# Patient Record
Sex: FEMALE | Race: BLACK OR AFRICAN AMERICAN | NOT HISPANIC OR LATINO | Employment: UNEMPLOYED | URBAN - METROPOLITAN AREA
[De-identification: names, ages, dates, MRNs, and addresses within clinical notes are randomized per-mention and may not be internally consistent; named-entity substitution may affect disease eponyms.]

---

## 2017-03-01 ENCOUNTER — HISTORICAL (OUTPATIENT)
Dept: ADMINISTRATIVE | Facility: HOSPITAL | Age: 57
End: 2017-03-01

## 2017-06-14 DIAGNOSIS — M79.641 BILATERAL HAND PAIN: Primary | ICD-10-CM

## 2017-06-14 DIAGNOSIS — M79.642 BILATERAL HAND PAIN: Primary | ICD-10-CM

## 2017-06-15 ENCOUNTER — HOSPITAL ENCOUNTER (OUTPATIENT)
Dept: RADIOLOGY | Facility: HOSPITAL | Age: 57
Discharge: HOME | End: 2017-06-15
Attending: ORTHOPAEDIC SURGERY
Payer: MEDICARE

## 2017-06-15 ENCOUNTER — OFFICE VISIT (OUTPATIENT)
Dept: ORTHOPEDICS | Facility: CLINIC | Age: 57
End: 2017-06-15
Payer: MEDICARE

## 2017-06-15 VITALS
HEIGHT: 59 IN | WEIGHT: 293 LBS | RESPIRATION RATE: 12 BRPM | HEART RATE: 80 BPM | BODY MASS INDEX: 59.07 KG/M2 | DIASTOLIC BLOOD PRESSURE: 78 MMHG | SYSTOLIC BLOOD PRESSURE: 121 MMHG

## 2017-06-15 DIAGNOSIS — R20.0 NUMBNESS AND TINGLING IN LEFT HAND: Primary | ICD-10-CM

## 2017-06-15 DIAGNOSIS — M79.641 BILATERAL HAND PAIN: ICD-10-CM

## 2017-06-15 DIAGNOSIS — S63.502A LEFT WRIST SPRAIN, INITIAL ENCOUNTER: ICD-10-CM

## 2017-06-15 DIAGNOSIS — M79.642 LEFT HAND PAIN: ICD-10-CM

## 2017-06-15 DIAGNOSIS — R20.2 NUMBNESS AND TINGLING IN LEFT HAND: Primary | ICD-10-CM

## 2017-06-15 DIAGNOSIS — M79.642 BILATERAL HAND PAIN: ICD-10-CM

## 2017-06-15 PROCEDURE — 99203 OFFICE O/P NEW LOW 30 MIN: CPT | Mod: S$GLB,,, | Performed by: PHYSICIAN ASSISTANT

## 2017-06-15 PROCEDURE — 99999 PR PBB SHADOW E&M-EST. PATIENT-LVL IV: CPT | Mod: PBBFAC,,, | Performed by: PHYSICIAN ASSISTANT

## 2017-06-15 PROCEDURE — 73130 X-RAY EXAM OF HAND: CPT | Mod: 26,RT,, | Performed by: RADIOLOGY

## 2017-06-15 PROCEDURE — 73130 X-RAY EXAM OF HAND: CPT | Mod: 26,LT,, | Performed by: RADIOLOGY

## 2017-06-15 RX ORDER — MELOXICAM 15 MG/1
15 TABLET ORAL DAILY
Qty: 30 TABLET | Refills: 1 | Status: SHIPPED | OUTPATIENT
Start: 2017-06-15 | End: 2017-07-20

## 2017-06-15 RX ORDER — DICLOFENAC SODIUM 50 MG/1
50 TABLET, DELAYED RELEASE ORAL 2 TIMES DAILY
COMMUNITY
End: 2017-06-15 | Stop reason: ALTCHOICE

## 2017-06-15 RX ORDER — FUROSEMIDE 20 MG/1
20 TABLET ORAL
COMMUNITY
End: 2017-11-06 | Stop reason: SDUPTHER

## 2017-06-15 RX ORDER — OMEGA-3-ACID ETHYL ESTERS 1 G/1
2 CAPSULE, LIQUID FILLED ORAL
COMMUNITY
End: 2017-11-06

## 2017-06-15 RX ORDER — GABAPENTIN 300 MG/1
CAPSULE ORAL
Refills: 2 | COMMUNITY
Start: 2017-03-08 | End: 2017-07-20

## 2017-06-15 RX ORDER — MULTIVITAMIN
1 TABLET ORAL
COMMUNITY
End: 2017-11-06
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
## 2017-07-03 ENCOUNTER — TELEPHONE (OUTPATIENT)
Dept: OTOLARYNGOLOGY | Facility: CLINIC | Age: 57
End: 2017-07-03

## 2017-07-03 NOTE — TELEPHONE ENCOUNTER
Returned patients phone call and rescheduled patients appointment for a day and time that worked well for the patient. Patient was very happy with the call and the call ended well.

## 2017-07-03 NOTE — TELEPHONE ENCOUNTER
----- Message from Molly uGerra sent at 6/30/2017  4:06 PM CDT -----  Contact: [phone]   Patient was scheduled on 6/30/17 for an appointment but state transportation did not bring her to the appointment. Patient has an appointment next week on 7/6/17 and would like to know if she can be worked into the schedule on the same day.patient is having a hard time swallowing and throat and tongue are both sore. Please call back at [phone].      Thanks,  Molly Guerra

## 2017-07-06 ENCOUNTER — OFFICE VISIT (OUTPATIENT)
Dept: PHYSICAL MEDICINE AND REHAB | Facility: CLINIC | Age: 57
End: 2017-07-06
Payer: MEDICARE

## 2017-07-06 ENCOUNTER — TELEPHONE (OUTPATIENT)
Dept: PULMONOLOGY | Facility: CLINIC | Age: 57
End: 2017-07-06

## 2017-07-06 ENCOUNTER — PROCEDURE VISIT (OUTPATIENT)
Dept: PULMONOLOGY | Facility: CLINIC | Age: 57
End: 2017-07-06
Payer: MEDICARE

## 2017-07-06 ENCOUNTER — HOSPITAL ENCOUNTER (OUTPATIENT)
Dept: RADIOLOGY | Facility: HOSPITAL | Age: 57
Discharge: HOME | End: 2017-07-06
Attending: NURSE PRACTITIONER
Payer: MEDICARE

## 2017-07-06 ENCOUNTER — OFFICE VISIT (OUTPATIENT)
Dept: PULMONOLOGY | Facility: CLINIC | Age: 57
End: 2017-07-06
Payer: MEDICARE

## 2017-07-06 VITALS
BODY MASS INDEX: 59.07 KG/M2 | HEART RATE: 73 BPM | SYSTOLIC BLOOD PRESSURE: 124 MMHG | DIASTOLIC BLOOD PRESSURE: 78 MMHG | RESPIRATION RATE: 14 BRPM | HEIGHT: 59 IN | WEIGHT: 293 LBS

## 2017-07-06 VITALS
RESPIRATION RATE: 22 BRPM | BODY MASS INDEX: 59.07 KG/M2 | HEIGHT: 59 IN | OXYGEN SATURATION: 99 % | HEART RATE: 80 BPM | SYSTOLIC BLOOD PRESSURE: 120 MMHG | DIASTOLIC BLOOD PRESSURE: 70 MMHG | WEIGHT: 293 LBS

## 2017-07-06 DIAGNOSIS — R06.02 SOB (SHORTNESS OF BREATH): ICD-10-CM

## 2017-07-06 DIAGNOSIS — R06.02 SOB (SHORTNESS OF BREATH): Primary | ICD-10-CM

## 2017-07-06 DIAGNOSIS — E66.01 MORBID OBESITY WITH BMI OF 60.0-69.9, ADULT: ICD-10-CM

## 2017-07-06 DIAGNOSIS — I51.7 MILD CARDIOMEGALY: ICD-10-CM

## 2017-07-06 DIAGNOSIS — J20.9 ACUTE BRONCHITIS, UNSPECIFIED ORGANISM: ICD-10-CM

## 2017-07-06 DIAGNOSIS — G56.03 BILATERAL CARPAL TUNNEL SYNDROME: Primary | ICD-10-CM

## 2017-07-06 DIAGNOSIS — H66.92 ACUTE LEFT OTITIS MEDIA: ICD-10-CM

## 2017-07-06 DIAGNOSIS — S63.502S LEFT WRIST SPRAIN, SEQUELA: ICD-10-CM

## 2017-07-06 DIAGNOSIS — G47.30 SLEEP APNEA, UNSPECIFIED TYPE: ICD-10-CM

## 2017-07-06 DIAGNOSIS — R60.0 BILATERAL LEG EDEMA: ICD-10-CM

## 2017-07-06 PROBLEM — G89.29 CHRONIC MIDLINE LOW BACK PAIN WITH BILATERAL SCIATICA: Status: ACTIVE | Noted: 2017-03-07

## 2017-07-06 PROBLEM — E78.00 HYPERCHOLESTEROLEMIA: Status: ACTIVE | Noted: 2017-03-08

## 2017-07-06 PROBLEM — M54.42 CHRONIC MIDLINE LOW BACK PAIN WITH BILATERAL SCIATICA: Status: ACTIVE | Noted: 2017-03-07

## 2017-07-06 PROBLEM — R73.01 ELEVATED FASTING GLUCOSE: Status: ACTIVE | Noted: 2017-03-08

## 2017-07-06 PROBLEM — I10 ESSENTIAL HYPERTENSION: Status: ACTIVE | Noted: 2017-03-07

## 2017-07-06 PROBLEM — M54.41 CHRONIC MIDLINE LOW BACK PAIN WITH BILATERAL SCIATICA: Status: ACTIVE | Noted: 2017-03-07

## 2017-07-06 PROBLEM — R73.09 ELEVATED GLYCOSYLATED HEMOGLOBIN: Status: ACTIVE | Noted: 2017-03-08

## 2017-07-06 LAB
PRE FEV1 FVC: 78.09 % (ref 69.36–90.71)
PRE FEV1: 1.75 L (ref 1.22–2.25)
PRE FVC: 2.24 L (ref 1.59–2.8)
PRE PEF: 6.07 L/S (ref 3.31–6.72)

## 2017-07-06 PROCEDURE — 94060 EVALUATION OF WHEEZING: CPT | Mod: S$GLB,,, | Performed by: INTERNAL MEDICINE

## 2017-07-06 PROCEDURE — 99999 PR PBB SHADOW E&M-EST. PATIENT-LVL IV: CPT | Mod: PBBFAC,,, | Performed by: NURSE PRACTITIONER

## 2017-07-06 PROCEDURE — 71020 XR CHEST PA AND LATERAL: CPT | Mod: TC,PO

## 2017-07-06 PROCEDURE — 99205 OFFICE O/P NEW HI 60 MIN: CPT | Mod: S$GLB,,, | Performed by: NURSE PRACTITIONER

## 2017-07-06 PROCEDURE — 99203 OFFICE O/P NEW LOW 30 MIN: CPT | Mod: 25,S$GLB,, | Performed by: PHYSICAL MEDICINE & REHABILITATION

## 2017-07-06 PROCEDURE — 71020 XR CHEST PA AND LATERAL: CPT | Mod: 26,,, | Performed by: RADIOLOGY

## 2017-07-06 PROCEDURE — 95911 NRV CNDJ TEST 9-10 STUDIES: CPT | Mod: S$GLB,,, | Performed by: PHYSICAL MEDICINE & REHABILITATION

## 2017-07-06 PROCEDURE — 99999 PR PBB SHADOW E&M-EST. PATIENT-LVL III: CPT | Mod: PBBFAC,,, | Performed by: PHYSICAL MEDICINE & REHABILITATION

## 2017-07-06 RX ORDER — PREDNISONE 20 MG/1
TABLET ORAL
Qty: 20 TABLET | Refills: 0 | Status: SHIPPED | OUTPATIENT
Start: 2017-07-06 | End: 2017-07-20 | Stop reason: SDDI

## 2017-07-06 RX ORDER — AMOXICILLIN AND CLAVULANATE POTASSIUM 875; 125 MG/1; MG/1
1 TABLET, FILM COATED ORAL 2 TIMES DAILY
Qty: 20 TABLET | Refills: 0 | Status: SHIPPED | OUTPATIENT
Start: 2017-07-06 | End: 2017-07-16

## 2017-07-06 NOTE — PATIENT INSTRUCTIONS
Monitoring Your Sleep: Sleep Lab Testing  Checking your sleep during a nighttime sleep study is often the only way to find out if you have conditions such as sleep apnea or other sleep problems. A sleep study records how your lungs, heart, brain, and other parts of your body function while youre asleep. Its painless, risk-free, and in most cases takes a single, full night.     During a sleep study, sensors track and record body functions.   Testing in a sleep clinic  If you spend the night in a sleep clinic, you will have a private bedroom. A technician will attach many sensors to your body, then go into another room. As you sleep, your heart rate, breathing, oxygen level, brain activity, and other functions will be tracked. A microphone and video camera will record your breathing sounds and body movements. The technician will keep watch nearby. If you need an air pressure device to help you breathe, one will be available.  Tips for testing in a sleep lab  · Before your sleep study, bathe and wash your hair. Dont use conditioners, oils, or makeup.  · Stick to your normal routine. If you usually drink alcohol, exercise, or take medication before bed, ask your healthcare provider whether you should do so the night of your study. Most people undergoing a sleep study should take all of their medicines as they typically would at home. But, it is best to check with your healthcare provider to confirm.  · Bring your toothbrush, sleepwear, pillow, something to read, and anything else that will help you sleep well.  Getting the results  The results of your sleep study need to be scored and interpreted. Once this is done, your healthcare provider will discuss the findings with you. The sleep study results will show whether you have sleep apnea. It can also tell how severe the apnea is. The findings help your healthcare provider know which treatment or treatments may be the right ones for you.  Date Last Reviewed:  8/9/2015 © 2000-2016 [employer]. [address] [place] [address], [place] [address]. All rights reserved. This information is not intended as a substitute for professional medical care. Always follow your healthcare professional's instructions.          What is a Sleep Study?  Do you often have problems sleeping? Do you feel tired most days of the week? Talk to your healthcare provider or a sleep specialist. He or she may suggest that you have a sleep study. It can help diagnose a sleep disorder such as sleep apnea or narcolepsy. During the study, a special machine is used to monitor your sleep.    Who needs a sleep study?  If you have sleep problems that last longer than a few weeks, you may need a sleep study. Talk to your healthcare provider. Be prepared to answer questions about your health history. Try to keep a daily sleep diary for a week or 2. Write down the time you go to bed, the time you wake up, and anything that seems to affect your sleep. Then your healthcare provider can refer you to a sleep specialist and recommend a sleep study.  Monitoring your sleep  Your sleep can be monitored at a sleep clinic or at your home. In either case, your healthcare provider will discuss the results with you at a future visit:  · At a sleep clinic. Most sleep studies are done at a sleep clinic or a sleep lab. In many cases, you will need to stay overnight. You will sleep in a private room, much like a hotel or hospital room. A family member or a friend can come along, but cannot stay overnight. Most people dont have trouble sleeping during the study. In the morning you can go home. Sometimes you may be asked to remain at the lab the next day for a daytime nap study.  · At home. At times, a sleep study can be done at home. A home sleep study provides most of the same information as a study done at a clinic. A special computer is loaned to you by a sleep clinic or a medical supplier. You will be given instructions  on how to use it. Or, someone may come to your home to help. Before bedtime, the computer is turned on to monitor your sleep all night. In the morning, you return the computer.  Date Last Reviewed: 8/9/2015  © 9442-7214 [employer]. [address] [place] [address]. All rights reserved. This information is not intended as a substitute for professional medical care. Always follow your healthcare professional's instructions.

## 2017-07-06 NOTE — PROGRESS NOTES
"Subjective:     Chief Complaint   Patient presents with    Shortness of Breath        Bernadette Gibson is a 57 y.o. female who presents for initial pulmonary evaluation of shortness of breath over the past about 3 months with review of spirometry and chest xray.  Spirometry is normal.   Chest xray revealed borderline cardiomegaly and mild prominence of central pulmonary vasculature.   Dyspnea has been moderate, and generally lasting brief, comes and goes.   Episodes usually occur intermittently, during sleep and off and on during the day and have been unchanged.   Associated symptoms include: cough with white and yellow sputum and leg swelling. Cough is at night with a sensation of gasping for air.   The symptoms are aggravated by recumbency, and relieved by sitting up.    Never diagnosed with asthma or copd, has hx of wheezing in past with a bout of bronchitis.   Patient reports she was exposed to a gas odor outside her home about 3 months ago which she reports [employer] has corrected.   She feels her cough and shortness of breath are related to her smelling "gas".   She reports watery eyes off and on over past 3 months, has seen opthalmology.  Complaint of left ear pain and nasal congestion acute. She has scheduled herself ENT appointment for 7/7/2017.   Patient has been told she snores and awakening gasping for air at night with sensation of shortness of breath.  Patient reports awakening feeling refreshed, on day she feels sleepy, she lays down for more sleep.  She denies morning headache.   Shedenies impairment of activity during wakefulness.  She reports day time napping.  Day time napping duration 2 hours sometimes longer.   [place] sleepiness score was 15.  Neck circumference is 42 cm.   She reports weight gain over past year.   Mallampati score 3  Cardiovascular risk factors: hypertension, hyperlipidemia, coronary artery disease and obesity  Bed time is 0800pm, awake at 12-1am watch [employer], watches 1-2 movies, " snacks, and does chores, then back to bed 4-5 am and sleeps until about 12 noon.  If has appointments, then to bed 7pm - 1 am and will not watch TV so can go back to sleep and up for 7am.  Wake time is sporadic.   Sleep onset is within 15 Minutes.  Sleep maintenance difficulties related to awakening gasping and sob.   Wake after sleep onset occurs two times a night.  Nocturia occurs two times a night,   Sleep aids : occasional tylenol pm.   Dry mouth : No,   Sleep walking: No,   Sleep talking : No,   Sleep eating:No  Vivid Dreams : No,   Cataplexy : No,   Hypnogogic hallucinations:  No    STOP - BANG Questionnaire:     1. Snoring : Do you snore loudly ?    Yes  2. Tired : Do you often feel tired, fatigued, or sleepy during daytime?   Yes  3. Observed: Has anyone observed you stop breathing during your sleep?   Yes   4. Blood pressure : Do you have or are you being treated for high blood pressure?   Yes  5. BMI :BMI more than 35 kg/m2?   Yes  6. Age : Age over 50 yr old?   Yes  7. Neck circumference: Neck circumference greater than 40 cm?   Yes  8. Gender: Gender male?   No    Score: 7    High risk of PEREZ: Yes 5 - 8  Intermediate risk of PEREZ: Yes 3 - 4  Low risk of PEREZ: Yes 0 - 2    The following portions of the patient's history were reviewed and updated as appropriate: allergies, current medications, past family history, past medical history, past social history, past surgical history and problem list.    Review of Systems  A comprehensive review of systems was negative except for: Eyes: positive for watering off and on  Ears, nose, mouth, throat, and face: positive for earaches and nasal congestion  Respiratory: positive for cough, dyspnea on exertion and sputum  Gastrointestinal: positive for dyspepsia, nausea and reflux symptoms  Integument/breast: positive for breast tenderness and left breast tenderness to palpation  Musculoskeletal: positive for left wrist and hand pain  Behavioral/Psych: positive for obesity  "     Objective:      Physical Exam  /70 (BP Location: Right arm, Patient Position: Sitting, BP Method: Manual)   Pulse 80   Resp (!) 22   Ht 4' 11.02" (1.499 m)   Wt (!) 149.7 kg (330 lb 0.5 oz)   LMP  (LMP Unknown)   SpO2 99%   BMI 66.62 kg/m²   General appearance: alert, appears older than stated age, cooperative, morbidly obese and some whimpering about being exposed to gas and having multiple medical concerns  Head: Normocephalic, without obvious abnormality, atraumatic  Eyes: negative  Ears: abnormal TM left ear - erythematous and retracted, mild swelling and tenderness of left external canal with evidence of comedone.   Nose: Nares normal. Septum midline. Mucosa normal. No drainage or sinus tenderness.  Throat: lips, mucosa, and tongue normal; teeth and gums normal  Neck: no adenopathy, no carotid bruit, no JVD, supple, symmetrical, trachea midline and thyroid not enlarged, symmetric, no tenderness/mass/nodules  Lungs: clear to auscultation bilaterally  Breasts: Inspection negative, No nipple retraction or dimpling, No nipple discharge or bleeding, No axillary or supraclavicular adenopathy, Normal to palpation without dominant masses, positive findings: generalized tenderness of left breast  Heart: regular rate and rhythm, S1, S2 normal, no murmur, click, rub or gallop  Abdomen: soft, non-tender; bowel sounds normal; no masses,  no organomegaly  Extremities: edema 1+ bilateral lower legs and no ulcers, gangrene or trophic changes  Pulses: 2+ and symmetric  Skin: Skin color, texture, turgor normal. No rashes or lesions  Lymph nodes: Cervical, supraclavicular, and axillary nodes normal.  Neurologic: Grossly normal    Pulmonary function tests: 7/6/2017 FEV1: 1.75  (101 % predicted), FVC:  2.24 (102 % predicted), FEV1/FVC:  78 (98 % predicted).   Post bronchodilator: FEV1: 1.88  (109 % predicted), FVC:  2.32 (105 % predicted), FEV1/FVC:  81 (102 % predicted).   Normal spirometry. No bronchodilator " response.     Imaging  Chest x-ray: 7/6/2017  Chest two views.  Comparison 01/06/2011.  Findings: There is borderline cardiomegaly and mild prominence of the central pulmonary vasculature.    Minor chronic scarring or subsegmental atelectasis present in the lingula.    No confluent infiltrate, overt edema, or pleural effusion demonstrated.    Multilevel thoracic spondylosis.    Lab Review   No visits with results within 2 Month(s) from this visit.   Latest known visit with results is:   Historical on 06/10/2008   Component Date Value    BUN, Bld 06/10/2008 12     Sodium 06/10/2008 140     Potassium 06/10/2008 4.3     Chloride 06/10/2008 103     CO2 06/10/2008 25     Glucose 06/10/2008 87     Creatinine 06/10/2008 0.9     Calcium 06/10/2008 9.8            Assessment/Plan:           Bernadette PHILIPPE was seen today for shortness of breath.    Diagnoses and all orders for this visit:    SOB (shortness of breath)  Comments:  hypoventilation syndrome suspected with BMI 66. proceed with pul stress, abg. cardiac evaluation  Orders:  -     Ambulatory Referral to Cardiology  -     Stress test, pulmonary; Future  -     PULM - Arterial Blood Gases--in addition to PFT only; Future  -     Polysomnogram (CPAP will be added if patient meets diagnostic criteria.); Future    Acute bronchitis, unspecified organism  Comments:  productive cough yellow mucous over past 3 weeks, not improved, complete augmentin and prednisone taper.   Orders:  -     predniSONE (DELTASONE) 20 MG tablet; 3 po x 3 days, 2 po x 3 days, 1 po x 3 days, 1/2 tab x 4 days.  -     amoxicillin-clavulanate 875-125mg (AUGMENTIN) 875-125 mg per tablet; Take 1 tablet by mouth 2 (two) times daily.    Acute left otitis media  Comments:  acute left ear pain with erythema, begin Augmentin. keep ent appt for 7/7/2017    Orders:  -     amoxicillin-clavulanate 875-125mg (AUGMENTIN) 875-125 mg per tablet; Take 1 tablet by mouth 2 (two) times daily.    Sleep apnea, unspecified  type  Comments:  gasping, snoring, sob during night, morbid obesity w/bmi 66.6, Stop bang score 7. high risk for obstructive sleep apnea, proceed with sleep study.   Orders:  -     Polysomnogram (CPAP will be added if patient meets diagnostic criteria.); Future    Bilateral leg edema  Comments:  chronic, 1+ edema lower legs, on Lasix 20 mg daily. keep fu with pcp to establish care. fu with cardiology    Orders:  -     Ambulatory Referral to Cardiology    Mild cardiomegaly  Comments:  identified on cxr 7/6/2017. fu with cardiology for further evaluate.   Orders:  -     Ambulatory Referral to Cardiology  -     Polysomnogram (CPAP will be added if patient meets diagnostic criteria.); Future    Morbid obesity with BMI of 60.0-69.9, adult  Comments:  calorie reduction  Orders:  -     Ambulatory Referral to Cardiology  -     Stress test, pulmonary; Future  -     PULM - Arterial Blood Gases--in addition to PFT only; Future  -     Polysomnogram (CPAP will be added if patient meets diagnostic criteria.); Future         Patient is to keep upcoming appointments, ENT appointment scheduled for 7/7/2017 with Dr. Camara. PCP appointment to establish care scheduled for 7/11/2017 with Dr. Vashti Lozano.   Patient stated she had reaction to amoxil as a child with mild rash, she has taken as an adult without difficulty.   She reported doxy causes stomach upset in past.    Risk and benefits of steroid therapy were reviewed in detail and include, but not limited to, elevated blood sugars, joint avascular necrosis, necrosis of tissue or lipodystrophy or infection of the injection site, hallucinations, depression or worsening depression, insomnia, sweating, hyperactivity, tremors, suppression of one's own cortisone production, delayed wound healing and GI bleeding. The patient would like to proceed with steroid treatment knowing and verbally accepting the risks.      TIME SPENT WITH PATIENT: Time spent:60 minutes in face to face   discussion concerning diagnosis, prognosis, review of lab and test results, benefits of treatment as well as management of disease, counseling of patient and coordination of care between various health  care providers . Greater than half the time spent was used for coordination of care and counseling of patient as she had multiple complaints.     Return in about 2 weeks (around 7/19/2017) for Shortness of Breath , w/review pul stress/abg.

## 2017-07-06 NOTE — PROGRESS NOTES
[facility] [address] [place] [address]  Phone: [phone]          Full Name: jorge mays Patient ID: [id number]      Visit Date: 7/6/2017 08:15  Examining Physician: Belen Stafford M.D.  Referring Physician: laxmi  Reason for Referral: Uex pain    Chief Complaint   Patient presents with    Hand Pain     left wrist     HPI: This is a 57 year old LHD female who complains of left wrist pain since March.  She was diagnosed with a sprained her wrist while bracing herself in a vehicle that stopped suddently.  Since then she complains of a constant sharp, achy pain in her wrist with numbness/tingling into her fingers.  Wearing a wrist brace, rubbing her hand with bengay, and soaking in epsom salt provides some relief.  She as some issues with  strength as well.  She uses her hands a lot to write and reports it increases her discomfort.  The patient denies any problems until March. Upon review of records, she has a dx of right CTS in 2016    Upon exam:  WDWN female NAD alert and oriented  No focal atrophy noted to upper ext muscles except mild at left apb  Skin intact, no breakdown or dysvascular changes, nails intact  tinels + at left wrist  5/5 strength bilateral upper ext muscles except 4/5 apb bilaterally and 4/5 left FDI  2+radial pulses bilaterally  1+bic tric br bilaterally  hoffmans negative bilaterally  Neck shoulder elbow wrist ROM wnl, ttp at 1st mcp jt on left  Sensation: intact to light touch bilateral upper exts except dec at left hand fingertips    ROS  General- denies lethargy, weight change, fever, chills  Head/neck- denies swallowing difficulties  ENT- denies hearing changes  Cardiovascular-denies chest pain  Pulmonary- denies shortness of breath  GI- denies constipation or bowel incontinence  - denies bladder incontinence  Skin- denies wounds or rashes  Musculoskeletal- +weakness, + pain  Neurologic- +numbness and tingling  Psychiatric- denies depressive or  psychotic features, denies anxiety  Lymphatic-denies swelling  Endocrine- denies hypoglycemic symptoms/DM history    Entire procedure explained to patient prior to proceeding.  Verbal consent obtained. (Pt required multiple repetitions of information/explanation and was very agitated about testing contralateral arm)        SNC      Nerve / Sites Rec. Site Onset Lat Peak Lat Amp Segments Distance Velocity     ms ms µV  mm m/s   L Median - Digit II (Antidromic)      Wrist Dig II NR NR NR Wrist - Dig  NR   R Median - Digit II (Antidromic)      Wrist Dig II 3.9 4.8 2.7 Wrist - Dig  36   L Ulnar - Digit V (Antidromic)      Wrist Dig V 3.0 3.7 18.7 Wrist - Dig V 140 47   R Ulnar - Digit V (Antidromic)      Wrist Dig V 2.6 3.4 11.2 Wrist - Dig V 140 55   L Radial - Anatomical snuff box (Forearm)      Forearm Wrist 1.8 2.6 22.2 Forearm - Wrist 100 55   R Radial - Anatomical snuff box (Forearm)      Forearm Wrist 1.7 2.2 12.7 Forearm - Wrist 100 60       MNC      Nerve / Sites Muscle Latency Amplitude Duration Rel Amp Segments Distance Lat Diff Velocity     ms mV ms %  mm ms m/s   L Median - APB      Wrist APB 7.1 5.1 7.3 100 Wrist - APB 80        Elbow APB 11.5 4.7 7.7 91.5 Elbow - Wrist 220 4.4 50   R Median - APB      Wrist APB 5.9 7.6 6.9 100 Wrist - APB 80        Elbow APB 9.9 5.8 6.9 76.7 Elbow - Wrist 200 4.0 50   L Ulnar - ADM      Wrist ADM 3.0 9.0 5.8 100 Wrist - ADM 80        B.Elbow ADM 6.1 8.5 6.2 94.5 B.Elbow - Wrist 190 3.1 61      A.Elbow ADM 8.2 8.1 6.1 95.5 A.Elbow - B.Elbow 120 2.0 59         A.Elbow - Wrist  5.2    R Ulnar - ADM      Wrist ADM 2.8 8.9 7.5 100 Wrist - ADM 80        B.Elbow ADM 6.1 8.6 7.3 96.8 B.Elbow - Wrist 200 3.4 59      A.Elbow ADM 8.1 8.0 7.5 93.4 A.Elbow - B.Elbow 100 2.0 51         A.Elbow - Wrist  5.4                                  INTERPRETATION  -Bilateral median motor nerve conduction study showed prolonged latency, normal amplitude, and normal conduction  velocity  -Left median sensory nerve conduction study showed no response to stimulation  -Right median sensory nerve conduction study showed prolonged peak latency and dec amplitude  -Bilateral ulnar motor nerve conduction study showed normal latency, amplitude, and conduction velocity  -Bilateral ulnar sensory nerve conduction study showed normal peak latency and amplitude  -Bilateral radial sensory nerve conduction study showed normal peak latency and amplitude      IMPRESSION  1. ABNORMAL study  2. 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 Follow up with referring provider:Dr Samuel  2. Handouts on CTS, prevention, release, and wrist sprain provided.  3. This study is good for one year. If symptoms worsen or do not improve, please re-consult.    Belen Stafford M.D.  Physical Medicine and Rehab

## 2017-07-06 NOTE — PATIENT INSTRUCTIONS
Carpal Tunnel Syndrome    Carpal tunnel syndrome is a painful condition of the wrist and arm. It is caused by pressure on the median nerve.  The median nerve is one of the nerves that give feeling and movement to the hand. It passes through a tunnel in the wrist called the carpal tunnel. This tunnel is made up of bones and ligaments. Narrowing of this tunnel or swelling of the tissues inside the tunnel puts pressure on the median nerve. This causes numbness, pins and needles, or electric shooting pains in your hand and forearm. Often the pain is worse at night and may wake you when you are asleep.  Carpal tunnel syndrome may occur during pregnancy and with use of birth control pills. It is more common in workers who must often bend their wrists. It is also common in people who work with power tools that cause strong vibrations.  Home care  · Rest the painful wrist. Avoid repeated bending of the wrist back and forth. This puts pressure on the median nerve. Avoid using power tools with strong vibrations.  · If you were given a splint, wear it at night while you sleep. You may also wear it during the day for comfort.  · Move your fingers and wrists often to avoid stiffness.  · Elevate your arms on pillows when you lie down.  · Try using the unaffected hand more.  · Try not to hold your wrists in a bent, downward position.  · Sometimes changes in the work place may ease symptoms. If you type most of the day, it may help to change the position of your keyboard or add a wrist support. Your wrist should be in a neutral position and not bent back when typing.  · You may use over-the-counter pain medicine to treat pain and inflammation, unless another medicine was prescribed. Anti-inflammatory pain medicines, such as ibuprofen or naproxen may be more effective than acetaminophen, which treats pain, but not inflammation. If you have chronic liver or kidney disease or ever had a stomach ulcer or GI bleeding, talk with your  doctor before using these medicines.  · Opioid pain medicine will only give temporary relief and does not treat the problem. If pain continues, you may need a shot of a steroid drug into your wrist.  · If the above methods fail, you may need surgery. This will open the carpal tunnel and release the pressure on the trapped nerve.  Follow-up care  Follow up with your healthcare provider, or as advised, if the pain doesnt begin to improve within the next week.  If X-rays were taken, you will be notified of any new findings that may affect your care.  When to seek medical advice  Call your healthcare provider right away if any of these occur:  · Pain not improving with the above treatment  · Fingers or hand become cold, blue, numb, or tingly  · Your whole arm becomes swollen or weak  Date Last Reviewed: 11/23/2015  © 0993-1800 [employer]. [address] [place] [address]. All rights reserved. This information is not intended as a substitute for professional medical care. Always follow your healthcare professional's instructions.        Carpal Tunnel Syndrome Prevention Tips  Some repetitive hand activities put you at higher risk for carpal tunnel syndrome (CTS). But you can reduce your risk. Learn how to change the way you use your hands. Below are tips for at home and on the job. Be sure to also follow the hand and wrist safety policies at your workplace.      Keep your wrist in a neutral (straight) position when exercising.      Keep your wrist in neutral  Keep a neutral (straight) wrist position as often as you can. Dont use your wrist in a bent (flexed) position for long periods of time. This includes extended or twisted positions.  Watch your   Dont just use your thumb and index finger to grasp or lift. This can put stress on your wrist. When you can, use your whole hand and all its fingers to grasp an object.  Minimize repetition  Dont move your arms or hands or hold an object in  the same way for long periods of time. Even simple, light tasks can cause injury this way. Instead, alternate tasks or switch hands.  Rest your hands  Give your hands a break from time to time with a rest. Even a few minutes once an hour can help.  Reduce speed and force  Slow down the speed in which you do a forceful, repetitive motion. This gives your wrist time to recover from the effort. Use power tools to help reduce the force.  Strengthen the muscles  Weak muscles may lead to a poor wrist or arm position. Exercises will make your hand and arm muscles stronger. This can help you keep a better position.  Date Last Reviewed: 9/11/2015 © 2000-2016 [employer]. [address] [place] [address], [place] [address]. All rights reserved. This information is not intended as a substitute for professional medical care. Always follow your healthcare professional's instructions.        Understanding Carpal Tunnel Syndrome    The carpal tunnel is a narrow space inside the wrist. It is ringed by bone and a band of tough tissue called the transverse carpal ligament. A major nerve called the median nerve runs from the forearm into the hand through the carpal tunnel. Tendons also run through the carpal tunnel.  With carpal tunnel syndrome, the tendons or nearby tissues within the carpal tunnel may swell or thicken. Or the transverse carpal ligament may harden and shorten. This narrows the space in the carpal tunnel and puts pressure on the median nerve. This pressure leads to tingling and numbness of the hand and wrist. In time, the condition can make even simple tasks hard to do.  What causes carpal tunnel syndrome?  Doctors arent entirely clear why the condition occurs. Certain things may make a person more likely to have it. These include:  · Being female  · Being pregnant  · Being overweight  · Having diabetes or rheumatoid arthritis  Symptoms of carpal tunnel syndrome  Symptoms often come and go. At first, symptoms  may occur mainly at night. Later, they may be noticed during the day as well. They may get worse with activities such as driving, reading, typing, or holding a phone. Symptoms can include:  · Tingling and numbness in the hand or wrist  · Sharp pain that shoots up the arm or down to the fingers  · Hand stiffness or cramping, especially in the morning  · Trouble making a fist  · Hand weakness and clumsiness  Treatment for carpal tunnel syndrome  Certain treatments help reduce the pressure on the median nerve and relieve symptoms. Choices for treatment may include one or more of the following:  · Wrist splint. This involves wearing a special brace on the wrist and hand. The splint holds the wrist straight, in a neutral position. This helps keep the carpal tunnel as open as possible.  · Cortisone shots. Cortisone is a medicine that helps reduce swelling. It is injected directly into the wrist. It helps shrink tissues inside the carpal tunnel. This relieves symptoms for a time.  · Pain medicines. You may take over-the-counter or prescription medicines to help reduce swelling and relieve symptoms.  · Surgery. If the condition doesnt respond to other treatments and doesnt go away on its own, you may need surgery. During surgery, the surgeon cuts the transverse carpal ligament to relieve pressure on the median nerve.     When to call your healthcare provider  Call your healthcare provider right away if you have any of these:  · Fever of 100.4°F (38°C) or higher, or as directed  · Symptoms that dont get better, or get worse  · New symptoms   Date Last Reviewed: 3/10/2016  © 1930-2163 [employer]. [address] [place] [address], [place] [address]. All rights reserved. This information is not intended as a substitute for professional medical care. Always follow your healthcare professional's instructions.        Carpal Tunnel Release Surgery  Surgery may be done if your carpal tunnel syndrome (CTS) symptoms become  severe. Or, you may have surgery if no other treatment brings relief. There are 2 types of CTS procedures. You will be told about the one you will have. Youll also be instructed how to prepare for it.      The goals of surgery  Two types of surgery--open and endoscopic--are used to treat CTS.  · With open surgery, your surgeon makes one incision in your palm. Standard surgical tools are used.  · With endoscopic surgery, one or two small incisions may be made in your hand. A scope (with a very small camera attached) and tools are inserted under the carpal ligament. The surgeon then operates while watching images on a video screen. No matter which one you have, the goal remains the same: Your surgeon will relieve pressure on the median nerve. To do this, the transverse carpal ligament is cut (released).  After surgery  If youve had carpal tunnel surgery, you will spend a few hours resting before you go home. The nerve sensation and circulation in your hand will be checked at this time. For the safest healing, keep the following in mind.  · Keep your hand raised above heart level. This will help reduce swelling.  · Limit hand and wrist use as instructed. A wrist brace may be required.  · Take any pain medication as directed.  · Do hand exercises as directed by your surgeon or therapist.  When to call the surgeon  Call your surgeon if you notice any of the following:  · White or pale-blue hand or nails (If you pinch your skin or nail and the color doesnt return)  · Pain that is not relieved by prescribed medicine  · Loss of sensation or excess swelling in hand or fingers  · Fever over 100.4°F (38°C)   Date Last Reviewed: 9/11/2015 © 2000-2016 [facility]. [address] [place] [address]. All rights reserved. This information is not intended as a substitute for professional medical care. Always follow your healthcare professional's instructions.        Wrist Sprain  A sprain is an injury to  the ligaments or capsule that holds a joint together. There are no broken bones. Most sprains take about 3 to 6 weeks to heal. If it a severe sprain where the ligament is completely torn, it can take months to recover.    Most wrist sprains are treated with a splint, wrist brace, or elastic wrap for support. Severe sprains may require surgery.  Home care  · Keep your arm elevated to reduce pain and swelling. This is very important during the first 48 hours.  · Apply an ice pack over the injured area for 15 to 20 minutes every 3 to 6 hours. You should do this for the first 24 to 48 hours. You can make an ice pack by filling a plastic bag that seals at the top with ice cubes and then wrapping it with a thin towel. Continue to use ice packs for relief of pain and swelling as needed. As the ice melts, be careful to avoid getting your wrap, splint, or cast wet. After 48 hours, apply heat (warm shower or warm bath) for 15 to 20 minutes several times a day, or alternate ice and heat.   · You may use over-the-counter pain medicine to control pain, unless another pain medicine was prescribed. If you have chronic liver or kidney disease or ever had a stomach ulcer or GI bleeding, talk with your doctor before using these medicines.  · If you were given a splint or brace, wear it for the time advised by your doctor.  Follow-up care  Follow up with your healthcare provider as advised. Any X-rays you had today dont show any broken bones, breaks, or fractures. Sometimes fractures dont show up on the first X-ray. Bruises and sprains can sometimes hurt as much as a fracture. These injuries can take time to heal completely. If your symptoms dont improve or they get worse, talk with your doctor. You may need a repeat X-ray. If X-rays were taken, you will be told of any new findings that may affect your care.  When to seek medical advice  Call your healthcare provider right away if any of these occur:  · Pain or swelling  increases  · Fingers or hand becomes cold, blue, numb, or tingly  Date Last Reviewed: 11/20/2015  © 8194-4786 [employer]. [address] [place] [address], [place] [address]. All rights reserved. This information is not intended as a substitute for professional medical care. Always follow your healthcare professional's instructions.        Understanding a Wrist Sprain    A ligament is a strong band of tissue that connects bone to bone. The wrist has many ligaments. If any of these get stretched or torn, this is called a wrist sprain. A wrist sprain can be painful. It can also limit movement and use of the wrist and hand. Depending on the severity of the sprain, it may take a few weeks or longer for the wrist to heal.  Causes of a wrist sprain  Wrist sprains are most often caused by falling and landing on an outstretched hand. Anyone can get a wrist sprain. But the injury may be more likely in people who are very active or play sports.  Symptoms of a wrist sprain  At the time of injury, you may feel a popping or tearing in the wrist. You may have pain, redness, and swelling. You may also have some bruising. In some cases, symptoms may spread from the wrist to the hand. Until the wrist has healed, it may be hard to move or use the wrist and hand for normal tasks and activities, especially gripping and lifting.  Treating a wrist sprain  Treatment for wrist sprains may include any of the following:   · Prescription or over-the-counter medicines. These help reduce pain and swelling.  · A splint, brace, or cast. This is worn to support the wrist and keep the wrist from moving. You may need it for several weeks or longer, until the wrist heals.  · Physical therapy and exercises. These help improve strength, flexibility, and range of motion in the wrist and hand.  · Surgery. You may need surgery if the sprain is severe. For example, if the ligament is torn or if nearby tissues and bone are also injured. After surgery,  you will often need to wear a splint or cast for a month or longer, until the wrist has healed.  Self-care measures  You can do several things to help relieve pain and swelling. These may include:  · Limiting how much you move and use your wrist and hand  · Applying an ice pack to the injured area  · Keeping your wrist and hand raised (elevated) above heart level  · Wrapping your wrist in an elastic bandage  Possible complications  If the injury doesnt heal properly, it has a higher chance of happening again. The injury can also become long-term (chronic). This can cause ongoing pain, weakness, or instability of the wrist. Over time, arthritis may develop in the wrist. This can worsen pain and cause stiffness and limited movement of the wrist and hand.        When to call your healthcare provider  Call your healthcare provider right away if you have any of these:  · Fever of 100.4°F (38°C) or higher, or as directed  · Symptoms that dont get better with treatment, or get worse  · Hand or fingers that feel numb or very cold, turn blue or gray, or swell  · Symptoms such as redness, warmth, swelling, bleeding, or drainage that occur at the incision sites. This only applies if you had surgery.  · New symptoms   Date Last Reviewed: 3/10/2016  © 0165-7122 [employer]. [address] [place] [address], [place] [address]. All rights reserved. This information is not intended as a substitute for professional medical care. Always follow your healthcare professional's instructions.

## 2017-07-06 NOTE — LETTER
July 6, 2017      Juarez Samuel Sr., MD  [address] [facility] [place] [address]           [facility] - Physiatry  [address] [facility] [address] [address] [place] [address]  Phone: [phone]  Fax: [phone]          Patient: Bernadette Gibson   MR Number: [id number]   YOB: 1960   Date of Visit: 7/6/2017       Dear Dr. Mg Chaney:    Thank you for referring Bernadette Gibson to me for evaluation. Attached you will find relevant portions of my assessment and plan of care.    If you have questions, please do not hesitate to call me. I look forward to following Bernadette Gibson along with you.    Sincerely,    Belen Stafford MD    Enclosure  CC:  No Recipients    If you would like to receive this communication electronically, please contact [email or web address] or [phone] to request more information on [employer] access.    For providers and/or their staff who would like to refer a patient to [facility], please contact us through our one-stop-shop provider referral line, [facility], at [phone].    If you feel you have received this communication in error or would no longer like to receive these types of communications, please e-mail [email or web address]

## 2017-07-07 ENCOUNTER — OFFICE VISIT (OUTPATIENT)
Dept: OTOLARYNGOLOGY | Facility: CLINIC | Age: 57
End: 2017-07-07
Payer: MEDICARE

## 2017-07-07 VITALS
TEMPERATURE: 99 F | BODY MASS INDEX: 66.49 KG/M2 | DIASTOLIC BLOOD PRESSURE: 75 MMHG | SYSTOLIC BLOOD PRESSURE: 133 MMHG | WEIGHT: 293 LBS | HEART RATE: 82 BPM

## 2017-07-07 DIAGNOSIS — H92.03 OTALGIA, BILATERAL: ICD-10-CM

## 2017-07-07 DIAGNOSIS — R09.81 NASAL CONGESTION: ICD-10-CM

## 2017-07-07 DIAGNOSIS — R13.10 DYSPHAGIA, UNSPECIFIED TYPE: Primary | ICD-10-CM

## 2017-07-07 PROCEDURE — 99203 OFFICE O/P NEW LOW 30 MIN: CPT | Mod: 25,S$GLB,, | Performed by: OTOLARYNGOLOGY

## 2017-07-07 PROCEDURE — 31575 DIAGNOSTIC LARYNGOSCOPY: CPT | Mod: S$GLB,,, | Performed by: OTOLARYNGOLOGY

## 2017-07-07 PROCEDURE — 99999 PR PBB SHADOW E&M-EST. PATIENT-LVL III: CPT | Mod: PBBFAC,,, | Performed by: OTOLARYNGOLOGY

## 2017-07-07 RX ORDER — FLUTICASONE PROPIONATE 50 MCG
2 SPRAY, SUSPENSION (ML) NASAL DAILY
Qty: 1 BOTTLE | Refills: 12 | Status: SHIPPED | OUTPATIENT
Start: 2017-07-07 | End: 2017-07-20
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
## 2017-07-20 ENCOUNTER — OFFICE VISIT (OUTPATIENT)
Dept: ORTHOPEDICS | Facility: CLINIC | Age: 57
End: 2017-07-20
Payer: MEDICARE

## 2017-07-20 VITALS
HEART RATE: 76 BPM | SYSTOLIC BLOOD PRESSURE: 113 MMHG | WEIGHT: 293 LBS | DIASTOLIC BLOOD PRESSURE: 69 MMHG | BODY MASS INDEX: 59.07 KG/M2 | HEIGHT: 59 IN | RESPIRATION RATE: 12 BRPM

## 2017-07-20 DIAGNOSIS — M25.532 LEFT WRIST PAIN: Primary | ICD-10-CM

## 2017-07-20 DIAGNOSIS — G56.02 LEFT CARPAL TUNNEL SYNDROME: Primary | ICD-10-CM

## 2017-07-20 PROCEDURE — 99214 OFFICE O/P EST MOD 30 MIN: CPT | Mod: S$GLB,,, | Performed by: ORTHOPAEDIC SURGERY

## 2017-07-20 PROCEDURE — 99999 PR PBB SHADOW E&M-EST. PATIENT-LVL III: CPT | Mod: PBBFAC,,, | Performed by: ORTHOPAEDIC SURGERY

## 2017-07-20 RX ORDER — NABUMETONE 500 MG/1
500 TABLET, FILM COATED ORAL DAILY
Qty: 30 TABLET | Refills: 2 | Status: SHIPPED | OUTPATIENT
Start: 2017-07-20 | End: 2017-11-06

## 2017-07-20 NOTE — LETTER
July 20, 2017      Sondra Barker PA-C  [address] [place] [address]           [facility]  [address]  [place] [address]  Phone: [phone]  Fax: [phone]          Patient: Bernadette Gibson   MR Number: [id number]   YOB: 1960   Date of Visit: 7/20/2017       Dear Sondra Barker:    Thank you for referring Bernadette Gibson to me for evaluation. Attached you will find relevant portions of my assessment and plan of care.    If you have questions, please do not hesitate to call me. I look forward to following Bernadette Gibson along with you.    Sincerely,    Juraez Samuel Sr., MD    Enclosure  CC:  No Recipients    If you would like to receive this communication electronically, please contact [email or web address] or [phone] to request more information on [facility] access.    For providers and/or their staff who would like to refer a patient to [facility], please contact us through our one-stop-shop provider referral line, [facility] , at [phone].    If you feel you have received this communication in error or would no longer like to receive these types of communications, please e-mail [email or web address]
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
## 2017-07-20 NOTE — PATIENT INSTRUCTIONS
Carpal Tunnel Syndrome    Carpal tunnel syndrome is a painful condition of the wrist and arm. It is caused by pressure on the median nerve.  The median nerve is one of the nerves that give feeling and movement to the hand. It passes through a tunnel in the wrist called the carpal tunnel. This tunnel is made up of bones and ligaments. Narrowing of this tunnel or swelling of the tissues inside the tunnel puts pressure on the median nerve. This causes numbness, pins and needles, or electric shooting pains in your hand and forearm. Often the pain is worse at night and may wake you when you are asleep.  Carpal tunnel syndrome may occur during pregnancy and with use of birth control pills. It is more common in workers who must often bend their wrists. It is also common in people who work with power tools that cause strong vibrations.  Home care  · Rest the painful wrist. Avoid repeated bending of the wrist back and forth. This puts pressure on the median nerve. Avoid using power tools with strong vibrations.  · If you were given a splint, wear it at night while you sleep. You may also wear it during the day for comfort.  · Move your fingers and wrists often to avoid stiffness.  · Elevate your arms on pillows when you lie down.  · Try using the unaffected hand more.  · Try not to hold your wrists in a bent, downward position.  · Sometimes changes in the work place may ease symptoms. If you type most of the day, it may help to change the position of your keyboard or add a wrist support. Your wrist should be in a neutral position and not bent back when typing.  · You may use over-the-counter pain medicine to treat pain and inflammation, unless another medicine was prescribed. Anti-inflammatory pain medicines, such as ibuprofen or naproxen may be more effective than acetaminophen, which treats pain, but not inflammation. If you have chronic liver or kidney disease or ever had a stomach ulcer or GI bleeding, talk with your  doctor before using these medicines.  · Opioid pain medicine will only give temporary relief and does not treat the problem. If pain continues, you may need a shot of a steroid drug into your wrist.  · If the above methods fail, you may need surgery. This will open the carpal tunnel and release the pressure on the trapped nerve.  Follow-up care  Follow up with your healthcare provider, or as advised, if the pain doesnt begin to improve within the next week.  If X-rays were taken, you will be notified of any new findings that may affect your care.  When to seek medical advice  Call your healthcare provider right away if any of these occur:  · Pain not improving with the above treatment  · Fingers or hand become cold, blue, numb, or tingly  · Your whole arm becomes swollen or weak  Date Last Reviewed: 11/23/2015  © 1524-5188 [employer]. [address] [place] [address], [place] [address]. All rights reserved. This information is not intended as a substitute for professional medical care. Always follow your healthcare professional's instructions.

## 2017-07-21 ENCOUNTER — TELEPHONE (OUTPATIENT)
Dept: ORTHOPEDICS | Facility: CLINIC | Age: 57
End: 2017-07-21

## 2017-07-27 ENCOUNTER — CLINICAL SUPPORT (OUTPATIENT)
Dept: REHABILITATION | Facility: HOSPITAL | Age: 57
End: 2017-07-27
Attending: ORTHOPAEDIC SURGERY
Payer: MEDICARE

## 2017-07-27 DIAGNOSIS — M25.532 LEFT WRIST PAIN: ICD-10-CM

## 2017-07-27 PROCEDURE — 97165 OT EVAL LOW COMPLEX 30 MIN: CPT | Performed by: OCCUPATIONAL THERAPIST

## 2017-07-27 PROCEDURE — G8987 SELF CARE CURRENT STATUS: HCPCS | Mod: CK | Performed by: OCCUPATIONAL THERAPIST

## 2017-07-27 PROCEDURE — 97530 THERAPEUTIC ACTIVITIES: CPT | Performed by: OCCUPATIONAL THERAPIST

## 2017-07-27 PROCEDURE — G8988 SELF CARE GOAL STATUS: HCPCS | Mod: CI | Performed by: OCCUPATIONAL THERAPIST
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
## 2017-07-31 ENCOUNTER — CLINICAL SUPPORT (OUTPATIENT)
Dept: REHABILITATION | Facility: HOSPITAL | Age: 57
End: 2017-07-31
Attending: ORTHOPAEDIC SURGERY
Payer: MEDICARE

## 2017-07-31 DIAGNOSIS — G56.02 LEFT CARPAL TUNNEL SYNDROME: Primary | ICD-10-CM

## 2017-07-31 PROCEDURE — 97110 THERAPEUTIC EXERCISES: CPT | Performed by: OCCUPATIONAL THERAPIST

## 2017-07-31 PROCEDURE — 97530 THERAPEUTIC ACTIVITIES: CPT | Performed by: OCCUPATIONAL THERAPIST

## 2017-07-31 PROCEDURE — 97140 MANUAL THERAPY 1/> REGIONS: CPT | Performed by: OCCUPATIONAL THERAPIST

## 2017-08-01 ENCOUNTER — TELEPHONE (OUTPATIENT)
Dept: ORTHOPEDICS | Facility: CLINIC | Age: 57
End: 2017-08-01

## 2017-08-01 NOTE — TELEPHONE ENCOUNTER
Returned pt phone call. Pt was unavailable. Left message for pt to call back at earliest convenience.
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
## 2017-08-03 ENCOUNTER — TELEPHONE (OUTPATIENT)
Dept: ORTHOPEDICS | Facility: CLINIC | Age: 57
End: 2017-08-03

## 2017-08-03 NOTE — TELEPHONE ENCOUNTER
Attempted to contact the patient in regards to her returning the nurse phone call. Pt did not answer, left vm. -AS

## 2017-08-04 ENCOUNTER — TELEPHONE (OUTPATIENT)
Dept: ORTHOPEDICS | Facility: CLINIC | Age: 57
End: 2017-08-04

## 2017-08-04 NOTE — TELEPHONE ENCOUNTER
----- Message from Duran Hudson sent at 8/4/2017 11:07 AM CDT -----  Contact: pt  Pt is calling nurse staff regarding a New RX for pain . Pt states that pt has no mobility in the legs due to the weight. Pt call back [phone] to be advised today. thanks

## 2017-08-07 ENCOUNTER — TELEPHONE (OUTPATIENT)
Dept: ORTHOPEDICS | Facility: CLINIC | Age: 57
End: 2017-08-07

## 2017-08-07 NOTE — TELEPHONE ENCOUNTER
Returned pt phone call. Advised pt to see her primary care provider for long term pain medication. Pt verified understanding.

## 2017-08-07 NOTE — TELEPHONE ENCOUNTER
----- Message from Katerina Fernandez sent at 8/7/2017  7:46 AM CDT -----  Contact: Pt   Pt states she can not go to physical therapy today but her medication was not called in and she is unable to move her legs. Pt can be reached at [phone]

## 2017-08-11 ENCOUNTER — CLINICAL SUPPORT (OUTPATIENT)
Dept: REHABILITATION | Facility: HOSPITAL | Age: 57
End: 2017-08-11
Attending: ORTHOPAEDIC SURGERY
Payer: MEDICARE

## 2017-08-11 DIAGNOSIS — M25.532 LEFT WRIST PAIN: Primary | ICD-10-CM

## 2017-08-11 PROCEDURE — 97035 APP MDLTY 1+ULTRASOUND EA 15: CPT | Performed by: OCCUPATIONAL THERAPIST

## 2017-08-11 PROCEDURE — 97530 THERAPEUTIC ACTIVITIES: CPT | Performed by: OCCUPATIONAL THERAPIST

## 2017-08-11 PROCEDURE — 97140 MANUAL THERAPY 1/> REGIONS: CPT | Performed by: OCCUPATIONAL THERAPIST
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
## 2017-08-18 ENCOUNTER — CLINICAL SUPPORT (OUTPATIENT)
Dept: REHABILITATION | Facility: HOSPITAL | Age: 57
End: 2017-08-18
Attending: ORTHOPAEDIC SURGERY
Payer: MEDICARE

## 2017-08-18 DIAGNOSIS — G56.02 LEFT CARPAL TUNNEL SYNDROME: ICD-10-CM

## 2017-08-18 DIAGNOSIS — M25.532 LEFT WRIST PAIN: Primary | ICD-10-CM

## 2017-08-18 PROCEDURE — 97140 MANUAL THERAPY 1/> REGIONS: CPT | Performed by: OCCUPATIONAL THERAPIST

## 2017-08-18 PROCEDURE — 97530 THERAPEUTIC ACTIVITIES: CPT | Performed by: OCCUPATIONAL THERAPIST
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
## 2017-08-22 DIAGNOSIS — M25.562 BILATERAL KNEE PAIN: Primary | ICD-10-CM

## 2017-08-22 DIAGNOSIS — M25.561 BILATERAL KNEE PAIN: Primary | ICD-10-CM

## 2017-08-23 ENCOUNTER — CLINICAL SUPPORT (OUTPATIENT)
Dept: REHABILITATION | Facility: HOSPITAL | Age: 57
End: 2017-08-23
Attending: ORTHOPAEDIC SURGERY
Payer: MEDICARE

## 2017-08-23 DIAGNOSIS — G56.02 LEFT CARPAL TUNNEL SYNDROME: ICD-10-CM

## 2017-08-23 DIAGNOSIS — M25.532 LEFT WRIST PAIN: Primary | ICD-10-CM

## 2017-08-23 PROCEDURE — 97530 THERAPEUTIC ACTIVITIES: CPT | Performed by: OCCUPATIONAL THERAPIST

## 2017-08-23 PROCEDURE — 97035 APP MDLTY 1+ULTRASOUND EA 15: CPT | Performed by: OCCUPATIONAL THERAPIST
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
## 2017-09-01 ENCOUNTER — CLINICAL SUPPORT (OUTPATIENT)
Dept: REHABILITATION | Facility: HOSPITAL | Age: 57
End: 2017-09-01
Attending: ORTHOPAEDIC SURGERY
Payer: MEDICARE

## 2017-09-01 DIAGNOSIS — G56.01 CARPAL TUNNEL SYNDROME OF RIGHT WRIST: ICD-10-CM

## 2017-09-01 DIAGNOSIS — G56.02 LEFT CARPAL TUNNEL SYNDROME: ICD-10-CM

## 2017-09-01 DIAGNOSIS — M25.532 LEFT WRIST PAIN: Primary | ICD-10-CM

## 2017-09-01 PROCEDURE — 97110 THERAPEUTIC EXERCISES: CPT | Performed by: OCCUPATIONAL THERAPIST

## 2017-09-01 PROCEDURE — 97140 MANUAL THERAPY 1/> REGIONS: CPT | Performed by: OCCUPATIONAL THERAPIST

## 2017-09-01 PROCEDURE — 97035 APP MDLTY 1+ULTRASOUND EA 15: CPT | Performed by: OCCUPATIONAL THERAPIST
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
## 2017-09-05 DIAGNOSIS — M17.0 PRIMARY OSTEOARTHRITIS OF BOTH KNEES: Primary | ICD-10-CM

## 2017-09-05 DIAGNOSIS — M25.561 ACUTE PAIN OF BOTH KNEES: Primary | ICD-10-CM

## 2017-09-05 DIAGNOSIS — M25.562 ACUTE PAIN OF BOTH KNEES: Primary | ICD-10-CM

## 2017-09-07 ENCOUNTER — CLINICAL SUPPORT (OUTPATIENT)
Dept: REHABILITATION | Facility: HOSPITAL | Age: 57
End: 2017-09-07
Attending: ORTHOPAEDIC SURGERY
Payer: MEDICARE

## 2017-09-07 DIAGNOSIS — M25.532 LEFT WRIST PAIN: Primary | ICD-10-CM

## 2017-09-07 DIAGNOSIS — M17.0 PRIMARY OSTEOARTHRITIS OF BOTH KNEES: Primary | ICD-10-CM

## 2017-09-07 PROCEDURE — 97162 PT EVAL MOD COMPLEX 30 MIN: CPT | Performed by: PHYSICAL THERAPIST

## 2017-09-07 PROCEDURE — 97110 THERAPEUTIC EXERCISES: CPT | Performed by: OCCUPATIONAL THERAPIST

## 2017-09-07 PROCEDURE — 97140 MANUAL THERAPY 1/> REGIONS: CPT | Performed by: OCCUPATIONAL THERAPIST

## 2017-09-07 PROCEDURE — 97535 SELF CARE MNGMENT TRAINING: CPT | Performed by: PHYSICAL THERAPIST

## 2017-09-07 PROCEDURE — 97035 APP MDLTY 1+ULTRASOUND EA 15: CPT | Performed by: OCCUPATIONAL THERAPIST
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
## 2017-09-11 ENCOUNTER — CLINICAL SUPPORT (OUTPATIENT)
Dept: REHABILITATION | Facility: HOSPITAL | Age: 57
End: 2017-09-11
Attending: ORTHOPAEDIC SURGERY
Payer: MEDICARE

## 2017-09-11 DIAGNOSIS — M17.0 PRIMARY OSTEOARTHRITIS OF BOTH KNEES: Primary | ICD-10-CM

## 2017-09-11 DIAGNOSIS — M25.532 LEFT WRIST PAIN: Primary | ICD-10-CM

## 2017-09-11 DIAGNOSIS — G56.02 LEFT CARPAL TUNNEL SYNDROME: ICD-10-CM

## 2017-09-11 DIAGNOSIS — G56.01 CARPAL TUNNEL SYNDROME OF RIGHT WRIST: ICD-10-CM

## 2017-09-11 PROCEDURE — 97140 MANUAL THERAPY 1/> REGIONS: CPT | Performed by: PHYSICAL THERAPIST

## 2017-09-11 PROCEDURE — 97140 MANUAL THERAPY 1/> REGIONS: CPT | Performed by: OCCUPATIONAL THERAPIST

## 2017-09-11 PROCEDURE — 97110 THERAPEUTIC EXERCISES: CPT | Performed by: OCCUPATIONAL THERAPIST

## 2017-09-11 PROCEDURE — 97035 APP MDLTY 1+ULTRASOUND EA 15: CPT | Performed by: OCCUPATIONAL THERAPIST

## 2017-09-11 PROCEDURE — 97110 THERAPEUTIC EXERCISES: CPT | Performed by: PHYSICAL THERAPIST
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
## 2017-09-11 NOTE — PROGRESS NOTES
"PHYSICAL THERAPY DAILY PROGRESS NOTE    Referring Provider:  Dr. Kwame Bond    Diagnosis:       ICD-10-CM ICD-9-CM    1. Primary osteoarthritis of both knees M17.0 715.16      Orders:  Evaluate and Treat    Date of Initial Evaluation:  2017    Orders :  2017    Coding Cycle Visit # 2    SUBJECTIVE:  Patient reports she is in 8.5/10 pain today, but can't understand because she thought that she had OT first and wasn't going to have PT today due to some problems with her insurance.    Initial:  Patient reports onset of knee pain about 3 months ago.  She denies any specific injury, but reports that she has gained over 190 pounds in the last four months by eating things she shouldn't have eating.  She reports that she has been fasting over the past few days with praying.  When asked to describe her fasting, patient raised her voice and stated that none of her doctors and medical people understand that fasting is a deep Yarsani experience.  She then reported that she has not eaten anything during her fast, but has been drinking water to "flush her body so (her) soul can be pure and clear."  She reports that she did have beets yesterday "because beets are good for the blood."  She reports that she lives alone on the second story and is unable to walk to the mailbox or downstairs to take the trash out due to knee pain.  She reports that she rides a scooter in the store and that she should get a walker today or tomorrow.  She reports that her knees feel crabby and they knock when she stands or walks.  She reports that she has to grab for something to walk because her balance is off.  She reports that she was prescribed ointment to apply to painful areas, but states, "That don't help me at all."  She reports that she has been doing leg raises at home to strengthen her legs.    Past Medical History:   Diagnosis Date    Carpal tunnel syndrome     severe    Chronic back pain     Fractures     left femur "    Ruptured disc, thoracic      Patient Active Problem List   Diagnosis    Carpal tunnel syndrome of right wrist    Chronic midline low back pain with bilateral sciatica    Elevated fasting glucose    Elevated glycosylated hemoglobin    Essential hypertension    Hypercholesterolemia    Meralgia paresthetica of both lower extremities    Morbid obesity with BMI of 60.0-69.9, adult    Anal cancer    Left carpal tunnel syndrome    Mild cardiomegaly       Current Outpatient Prescriptions:     furosemide (LASIX) 20 MG tablet, Take 20 mg by mouth., Disp: , Rfl:     multivitamin (THERAGRAN) per tablet, Take 1 tablet by mouth., Disp: , Rfl:     nabumetone (RELAFEN) 500 MG tablet, Take 1 tablet (500 mg total) by mouth once daily., Disp: 30 tablet, Rfl: 2    omega-3 acid ethyl esters (LOVAZA) 1 gram capsule, Take 2 g by mouth., Disp: , Rfl:     OBJECTIVE:  Pain: 8/10, located anterior and posterior knees, described as pain    Sensation:  intact to light touch     Knee ROM:  Flexion     R 100 degrees L 72 degrees       Extension   R 18 degrees  L 3 degrees       Strength:  Psoas    R 4/5 L 5/5     Quadriceps   R 4/5 L 5/5       Hamstrings   R 4/5 L 4+/5     Gluteus Medius  R 3+/5 L 3+/5      Tensor Fascia Nicole  R 4/5 L 5/5          Ankle Plantarflexion  R 5/5 L 5/5     Ankle Dorsiflexion   R 5/5 L 4+/5     Ankle Inversion  R NT L NT     Ankle Eversion  R NT L NT        Function: Optimal Instrument     The following scores are patient-reported values, with 1 representing the ability to do the activity without any difficulty, 2 representing the ability to do the activity with little difficulty, 3 representing the ability to do with moderate difficulty, 4 representing the ability to do the activity with much difficulty, 5 representing the inability to do do the activity, and 9 representing that the activity is not applicable.    1.  Lying flat   3  2.  Rolling over  5  3.  Moving - lying to sitting 3  4.   Sitting   3  5.  Squatting   9  6.  Bending/stooping  4  7.  Balancing   4  8.  Kneeling   4  9.  Standing   4  10.  Walking - short distance 5  11.  Walking - long distance 5  12.  Walking - outdoors 5  13.  Climbing stairs  5  14.  Hopping   9  15.  Jumping   9  16.  Running   5  17.  Pushing   9  18.  Pulling   9  19.  Reaching   9  20.  Grasping   9  21.  Lifting   9  22.  Carrying   9      Total  55    Patient reports 80.77% disability on the self reported Optimal Instrument.  Her reported functional impairment level is G-[id number].    Tenderness to palpation:  None noted.  Noted left patella sits more laterally than the right.    Other:  Patient ambulates with antalgic gait, forward bent posture.  Patient's gaze and head continuously move throughout evaluation.  She interrupts conversation very frequently to explain things about herself.  Patient speaks to herself in third person frequently throughout evaluation.  Patient is very disrespectful in the way she speaks to the therapist throughout evaluation.  At one time, when told to remain in the chair while the therapist acquired the goniometer, the therapist walked in the treatment room to find the patient laying group home on the treatment table with her head opposite of the pillows.  Noted patient has difficulty following directions.       ASSESSMENT:  Patient fatigued quickly with low level strengthening activities.  Patient has not yet maximized full benefit from physical therapy at this time.    Initial:  The patient is a 57 y.o. year old female who presents to physical therapy with complaints of bilateral knee pain.  Patient's impairments include decreased knee range of motion, decreased knee and hip strength, impaired balance, and abnormal gait.  These impairments are limiting patient's ability to stand or walk for short distances.  Patient's prognosis is fair.  Patient will benefit from skilled physical therapy intervention to improve myofascial mobility  "with manual therapy, to strengthen weakened muscles and lengthen shortened muscles with therapeutic exercise, to improve balance with neuromuscular re-education, to improve gait with gait training, to decrease pain and promote healing with modalities, and to educate the patient to better enable her to achieve her goals.    Co-morbidities which may impact the plan of care and potentially impede the patient's progress in therapy include:  BMI 66.26 kg/m2, Chronic back pain, history of sun placement status post left femur break    The patient's clinical presentation is evolving.  Based on patient's evolving clinical presentation, three or more co-morbidities, and examination of multiple body systems, patient presents with moderate complexity.    Short Term Goals:  (3 weeks)  1.  Patient will demonstrate increased right knee strength to 4+/5 for improved functional mobility.  2.  Patient will increased right knee extension to 10 degrees or better for improved gait.  3.  Patient will be independent with home exercise program.    Long Term Goals:  (6 weeks)  1.  Patient will demonstrate increased right knee extension to 8 degrees or better for improved gait.  2.  Patient will demonstrate ability to balance in SLS on the R and L LEs x 1 second for improved gait.  3.  Patient will achieve improved functional impairment to 78% disability or better on the Optimal Instrument, [id number]-CL.    TREATMENT PROVIDED:    Manual Therapy:  Instrument assisted soft tissue mobilization was applied to bilateral IT bands, distal quadriceps, and pes anserine.  (8 minutes)    Therapeutic Exercise:  (30 minutes)    Heel slides x 20 each  Resisted heel slide red 2 x 10 each  Hamstring stretch 3 x 30" each  Ankle plantarflexion red 2 x 10 each  MIP 2 x 10  TA Bracing x 10  Seated ball squeeze x10  Seated clams yellow band x10  Seated partial LAQs x10 each     PLAN:  Patient will benefit from physical therapy (2-3) x/week for (6) weeks including " manual therapy, therapeutic exercise, neuromuscular re-education, functional activities, gait training, modalities, and patient education.  Awaiting return call from referring provider's nurse at the [facility], for a returned call to discuss patient's evaluation.  Phone number to [facility]:  [phone].    Thank you for this referral.    These services are reasonable and necessary for the conditions set forth above while under my care.

## 2017-09-14 ENCOUNTER — OFFICE VISIT (OUTPATIENT)
Dept: ORTHOPEDICS | Facility: CLINIC | Age: 57
End: 2017-09-14
Payer: MEDICARE

## 2017-09-14 VITALS
DIASTOLIC BLOOD PRESSURE: 102 MMHG | RESPIRATION RATE: 20 BRPM | WEIGHT: 293 LBS | SYSTOLIC BLOOD PRESSURE: 158 MMHG | HEART RATE: 82 BPM | HEIGHT: 59 IN | BODY MASS INDEX: 59.07 KG/M2

## 2017-09-14 DIAGNOSIS — G89.29 WRIST PAIN, CHRONIC, RIGHT: Primary | ICD-10-CM

## 2017-09-14 DIAGNOSIS — M25.531 WRIST PAIN, CHRONIC, RIGHT: Primary | ICD-10-CM

## 2017-09-14 DIAGNOSIS — G56.02 LEFT CARPAL TUNNEL SYNDROME: Primary | ICD-10-CM

## 2017-09-14 PROCEDURE — 3080F DIAST BP >= 90 MM HG: CPT | Mod: S$GLB,,, | Performed by: ORTHOPAEDIC SURGERY

## 2017-09-14 PROCEDURE — 3077F SYST BP >= 140 MM HG: CPT | Mod: S$GLB,,, | Performed by: ORTHOPAEDIC SURGERY

## 2017-09-14 PROCEDURE — 3008F BODY MASS INDEX DOCD: CPT | Mod: S$GLB,,, | Performed by: ORTHOPAEDIC SURGERY

## 2017-09-14 PROCEDURE — 99213 OFFICE O/P EST LOW 20 MIN: CPT | Mod: S$GLB,,, | Performed by: ORTHOPAEDIC SURGERY

## 2017-09-14 PROCEDURE — 99999 PR PBB SHADOW E&M-EST. PATIENT-LVL III: CPT | Mod: PBBFAC,,, | Performed by: ORTHOPAEDIC SURGERY

## 2017-09-14 RX ORDER — TRAMADOL HYDROCHLORIDE 50 MG/1
TABLET ORAL
COMMUNITY
Start: 2017-08-11 | End: 2017-11-06

## 2017-09-14 RX ORDER — BUDESONIDE AND FORMOTEROL FUMARATE DIHYDRATE 160; 4.5 UG/1; UG/1
AEROSOL RESPIRATORY (INHALATION)
COMMUNITY
Start: 2017-08-10 | End: 2017-11-06

## 2017-09-14 RX ORDER — MELOXICAM 15 MG/1
TABLET ORAL
COMMUNITY
Start: 2017-08-01 | End: 2017-11-06

## 2017-09-14 RX ORDER — FLUTICASONE PROPIONATE 50 MCG
1 SPRAY, SUSPENSION (ML) NASAL
COMMUNITY
Start: 2017-08-08 | End: 2018-08-08

## 2017-09-14 RX ORDER — LEVOFLOXACIN 750 MG/1
TABLET ORAL
COMMUNITY
Start: 2017-08-10 | End: 2017-11-06

## 2017-09-14 RX ORDER — DICLOFENAC SODIUM 10 MG/G
GEL TOPICAL
COMMUNITY
Start: 2017-08-14 | End: 2017-11-06

## 2017-09-14 RX ORDER — FLUTICASONE PROPIONATE 50 MCG
SPRAY, SUSPENSION (ML) NASAL
COMMUNITY
Start: 2017-07-27 | End: 2017-11-06 | Stop reason: SDUPTHER

## 2017-09-14 RX ORDER — BUDESONIDE AND FORMOTEROL FUMARATE DIHYDRATE 160; 4.5 UG/1; UG/1
2 AEROSOL RESPIRATORY (INHALATION)
COMMUNITY
Start: 2017-08-08 | End: 2017-11-06

## 2017-09-14 RX ORDER — TIZANIDINE 4 MG/1
TABLET ORAL
COMMUNITY
Start: 2017-08-11 | End: 2017-11-06

## 2017-09-14 NOTE — PATIENT INSTRUCTIONS
Carpal Tunnel Syndrome    Carpal tunnel syndrome is a painful condition of the wrist and arm. It is caused by pressure on the median nerve.  The median nerve is one of the nerves that give feeling and movement to the hand. It passes through a tunnel in the wrist called the carpal tunnel. This tunnel is made up of bones and ligaments. Narrowing of this tunnel or swelling of the tissues inside the tunnel puts pressure on the median nerve. This causes numbness, pins and needles, or electric shooting pains in your hand and forearm. Often the pain is worse at night and may wake you when you are asleep.  Carpal tunnel syndrome may occur during pregnancy and with use of birth control pills. It is more common in workers who must often bend their wrists. It is also common in people who work with power tools that cause strong vibrations.  Home care  · Rest the painful wrist. Avoid repeated bending of the wrist back and forth. This puts pressure on the median nerve. Avoid using power tools with strong vibrations.  · If you were given a splint, wear it at night while you sleep. You may also wear it during the day for comfort.  · Move your fingers and wrists often to avoid stiffness.  · Elevate your arms on pillows when you lie down.  · Try using the unaffected hand more.  · Try not to hold your wrists in a bent, downward position.  · Sometimes changes in the work place may ease symptoms. If you type most of the day, it may help to change the position of your keyboard or add a wrist support. Your wrist should be in a neutral position and not bent back when typing.  · You may use over-the-counter pain medicine to treat pain and inflammation, unless another medicine was prescribed. Anti-inflammatory pain medicines, such as ibuprofen or naproxen may be more effective than acetaminophen, which treats pain, but not inflammation. If you have chronic liver or kidney disease or ever had a stomach ulcer or GI bleeding, talk with your  doctor before using these medicines.  · Opioid pain medicine will only give temporary relief and does not treat the problem. If pain continues, you may need a shot of a steroid drug into your wrist.  · If the above methods fail, you may need surgery. This will open the carpal tunnel and release the pressure on the trapped nerve.  Follow-up care  Follow up with your healthcare provider, or as advised, if the pain doesnt begin to improve within the next week.  If X-rays were taken, you will be notified of any new findings that may affect your care.  When to seek medical advice  Call your healthcare provider right away if any of these occur:  · Pain not improving with the above treatment  · Fingers or hand become cold, blue, numb, or tingly  · Your whole arm becomes swollen or weak  Date Last Reviewed: 11/23/2015  © 6870-3038 [employer]. [address] [place] [address], [place] [address]. All rights reserved. This information is not intended as a substitute for professional medical care. Always follow your healthcare professional's instructions.
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
## 2017-09-25 ENCOUNTER — TELEPHONE (OUTPATIENT)
Dept: OBSTETRICS AND GYNECOLOGY | Facility: CLINIC | Age: 57
End: 2017-09-25

## 2017-09-25 NOTE — TELEPHONE ENCOUNTER
Left a message for the pt. to call back. josefina Calero    Pt would like to consult with nurse about seeing Dr Mena. She has questions before her appt. Please call romero at [phone]. Thx. lj

## 2017-09-25 NOTE — TELEPHONE ENCOUNTER
----- Message from Ricky Ortiz sent at 9/25/2017  9:31 AM CDT -----  Contact: [phone]  Pt would like to consult with nurse about seeing Dr Mena. She has questions before her appt. Please call romero at [phone]. x. lj

## 2017-09-27 ENCOUNTER — TELEPHONE (OUTPATIENT)
Dept: OBSTETRICS AND GYNECOLOGY | Facility: CLINIC | Age: 57
End: 2017-09-27

## 2017-09-27 NOTE — TELEPHONE ENCOUNTER
Spoke to the pt. and informed her that she needs to sign a release form to have her records released.  josefina Calero    Patient request a call back at [phone], Regards to getting her medical records sent to another provider's office at [phone] fax (attn: Dr. Bekah Montano).

## 2017-09-29 ENCOUNTER — TELEPHONE (OUTPATIENT)
Dept: RADIOLOGY | Facility: HOSPITAL | Age: 57
End: 2017-09-29

## 2017-10-03 ENCOUNTER — TELEPHONE (OUTPATIENT)
Dept: ORTHOPEDICS | Facility: CLINIC | Age: 57
End: 2017-10-03

## 2017-10-03 NOTE — TELEPHONE ENCOUNTER
Called pt to reschedule missed MRI. Pt was unavailable. Left message for pt to call back at earliest convenience.

## 2017-11-06 ENCOUNTER — PROCEDURE VISIT (OUTPATIENT)
Dept: PULMONOLOGY | Facility: CLINIC | Age: 57
End: 2017-11-06
Payer: MEDICARE

## 2017-11-06 ENCOUNTER — OFFICE VISIT (OUTPATIENT)
Dept: PULMONOLOGY | Facility: CLINIC | Age: 57
End: 2017-11-06
Payer: MEDICARE

## 2017-11-06 VITALS
HEART RATE: 71 BPM | HEIGHT: 59 IN | SYSTOLIC BLOOD PRESSURE: 92 MMHG | BODY MASS INDEX: 59.07 KG/M2 | DIASTOLIC BLOOD PRESSURE: 68 MMHG | RESPIRATION RATE: 18 BRPM | OXYGEN SATURATION: 98 % | WEIGHT: 293 LBS

## 2017-11-06 DIAGNOSIS — R06.02 SOB (SHORTNESS OF BREATH): ICD-10-CM

## 2017-11-06 DIAGNOSIS — J44.89 ASTHMA WITH COPD: ICD-10-CM

## 2017-11-06 DIAGNOSIS — R60.9 EDEMA, UNSPECIFIED TYPE: ICD-10-CM

## 2017-11-06 DIAGNOSIS — E66.01 MORBID OBESITY WITH BMI OF 60.0-69.9, ADULT: ICD-10-CM

## 2017-11-06 DIAGNOSIS — J45.41 MODERATE PERSISTENT ASTHMA WITH ACUTE EXACERBATION: ICD-10-CM

## 2017-11-06 DIAGNOSIS — J20.9 ACUTE BRONCHITIS, UNSPECIFIED ORGANISM: Primary | ICD-10-CM

## 2017-11-06 PROBLEM — R73.01 ELEVATED FASTING GLUCOSE: Status: RESOLVED | Noted: 2017-03-08 | Resolved: 2017-11-06

## 2017-11-06 PROBLEM — J45.909 MODERATE ASTHMA: Status: ACTIVE | Noted: 2017-08-08

## 2017-11-06 PROCEDURE — 94640 AIRWAY INHALATION TREATMENT: CPT | Mod: S$GLB,,, | Performed by: INTERNAL MEDICINE

## 2017-11-06 PROCEDURE — 99999 PR PBB SHADOW E&M-EST. PATIENT-LVL III: CPT | Mod: PBBFAC,,, | Performed by: INTERNAL MEDICINE

## 2017-11-06 PROCEDURE — 99215 OFFICE O/P EST HI 40 MIN: CPT | Mod: 25,S$GLB,, | Performed by: INTERNAL MEDICINE

## 2017-11-06 RX ORDER — CLARITHROMYCIN 500 MG/1
1000 TABLET, FILM COATED, EXTENDED RELEASE ORAL DAILY
Qty: 20 TABLET | Refills: 1 | Status: SHIPPED | OUTPATIENT
Start: 2017-11-06 | End: 2019-02-19 | Stop reason: SDUPTHER

## 2017-11-06 RX ORDER — FUROSEMIDE 20 MG/1
20 TABLET ORAL DAILY
Qty: 30 TABLET | Refills: 11 | Status: SHIPPED | OUTPATIENT
Start: 2017-11-06 | End: 2019-01-03 | Stop reason: SDUPTHER

## 2017-11-06 RX ORDER — GUAIFENESIN 600 MG/1
1200 TABLET, EXTENDED RELEASE ORAL 2 TIMES DAILY
Qty: 60 TABLET | COMMUNITY
Start: 2017-11-06 | End: 2017-11-06 | Stop reason: SDUPTHER

## 2017-11-06 RX ORDER — CLARITHROMYCIN 500 MG/1
1000 TABLET, FILM COATED, EXTENDED RELEASE ORAL DAILY
Qty: 20 TABLET | Refills: 1 | Status: SHIPPED | OUTPATIENT
Start: 2017-11-06 | End: 2017-11-06 | Stop reason: SDUPTHER

## 2017-11-06 RX ORDER — GUAIFENESIN 600 MG/1
1200 TABLET, EXTENDED RELEASE ORAL 2 TIMES DAILY
Qty: 60 TABLET | Status: SHIPPED | OUTPATIENT
Start: 2017-11-06 | End: 2017-11-16

## 2017-11-06 RX ORDER — IPRATROPIUM BROMIDE AND ALBUTEROL SULFATE 2.5; .5 MG/3ML; MG/3ML
3 SOLUTION RESPIRATORY (INHALATION) EVERY 6 HOURS PRN
Qty: 120 VIAL | Refills: 5 | Status: SHIPPED | OUTPATIENT
Start: 2017-11-06 | End: 2017-11-06 | Stop reason: SDUPTHER

## 2017-11-06 RX ORDER — PREDNISONE 20 MG/1
TABLET ORAL
Qty: 20 TABLET | Refills: 1 | Status: SHIPPED | OUTPATIENT
Start: 2017-11-06 | End: 2018-01-23

## 2017-11-06 RX ORDER — ALBUTEROL SULFATE 0.83 MG/ML
2.5 SOLUTION RESPIRATORY (INHALATION)
Status: COMPLETED | OUTPATIENT
Start: 2017-11-06 | End: 2017-11-06

## 2017-11-06 RX ORDER — PREDNISONE 20 MG/1
TABLET ORAL
Qty: 20 TABLET | Refills: 1 | Status: SHIPPED | OUTPATIENT
Start: 2017-11-06 | End: 2017-11-06 | Stop reason: SDUPTHER

## 2017-11-06 RX ORDER — IPRATROPIUM BROMIDE AND ALBUTEROL SULFATE 2.5; .5 MG/3ML; MG/3ML
3 SOLUTION RESPIRATORY (INHALATION) EVERY 6 HOURS PRN
Qty: 120 VIAL | Refills: 5 | Status: SHIPPED | OUTPATIENT
Start: 2017-11-06 | End: 2018-01-23

## 2017-11-06 RX ADMIN — ALBUTEROL SULFATE 2.5 MG: 0.83 SOLUTION RESPIRATORY (INHALATION) at 11:11

## 2017-11-06 NOTE — PATIENT INSTRUCTIONS
Low-Salt Choices  Eating salt (sodium) can make your body retain too much water. Excess water makes your heart work harder. Canned, packaged, and frozen foods are easy to prepare, but they are often high in sodium. Here are some ideas for low-salt foods you can easily prepare yourself.    For breakfast  · Fruit or 100% fruit juice  · Whole-wheat bread or an English muffin. Compare sodium content on labels.  · Low-fat milk or yogurt  · Unsalted eggs  · Shredded wheat  · Corn tortillas  · Unsalted steamed rice  · Regular (not instant) hot cereal, made without salt  Stay away from:  · Sausage, grey, and ham  · Flour tortillas  · Packaged muffins, pancakes, and biscuits  · Instant hot cereals  · Cottage cheese  For lunch and dinner  · Fresh fish, chicken, turkey, or meat--baked, broiled, or roasted without salt  · Dry beans, cooked without salt  · Tofu, stir-fried without salt  · Unsalted fresh fruit and vegetables, or frozen or canned fruit and vegetables with no added salt  Stay away from:  · Lunch or deli meat that is cured or smoked  · Cheese  · Tomato juice and catsup  · Canned vegetables, soups, and fish not labeled as no-salt-added or reduced sodium  · Packaged gravies and sauces  · Olives, pickles, and relish  · Bottled salad dressings  For snacks and desserts  · Yogurt  · Unsalted, air popped popcorn  · Unsalted nuts or seeds  Stay away from:  · Pies and cakes  · Packaged dessert mixes  · Pizza  · Canned and packaged puddings  · Pretzels, chips, crackers, and nuts--unless the label says unsalted  Date Last Reviewed: 6/17/2015  © 4587-2714 [employer]. [address] [place] [address], [place] [address]. All rights reserved. This information is not intended as a substitute for professional medical care. Always follow your healthcare professional's instructions.        Tips for Using Less Salt    Most people with heart problems need to eat less salt (sodium). Reducing the amount of salt you eat may help  control your blood pressure. The higher your blood pressure, the greater your risk for heart disease, stroke, blindness, and kidney problems.  At the store  · Make low-salt choices by reading labels carefully. Look for the total amount of sodium per serving.  · Use more fresh food. Buy more fruits and vegetables. Select lean meats, fish, and poultry.  · Use fewer frozen, canned, and packaged foods which often contain a lot of sodium.  · Use plain frozen vegetables without sauces or toppings. These products are often low- or no-sodium.  · Opt for reduced-sodium or no-salt-added versions of canned vegetables and soups.  In the kitchen  · Don't add salt to food when you're cooking. Season with flavorings such as onion, garlic, pepper, salt-free herbal blends, and lemon or lime juice.  · Use a cookbook containing low-salt recipes. It can give you ideas for tasty meals that are healthy for your heart.  · Sprinkle salt-free herbal blends on vegetables and meat.  · Drain and rinse canned foods, such as canned beans and vegetables, before cooking or eating.  Eating out  · Tell the  you're on a low-salt diet. Ask questions about the menu.  · Order fish, chicken, and meat broiled, baked, poached, or grilled without salt, butter, or breading.  · Use lemon, pepper, and salt-free herb mixes to add flavor.  · Choose plain steamed rice, boiled noodles, and baked or boiled potatoes. Top potatoes with chives and a little sour cream.     Beware! Salt goes by many other names. Limit foods with these words listed as ingredients: salt, sodium, soy sauce, baking soda, baking powder, MSG, monosodium, Na (the chemical symbol for sodium). Some antacids are also high in salt.   Date Last Reviewed: 6/19/2015  © 8330-8472 [employer]. [address] [place] [address], [place] [address]. All rights reserved. This information is not intended as a substitute for professional medical care. Always follow your healthcare professional's  instructions.        Kidney Disease: Eating Less Sodium  Sodium is a mineral that the body needs in small amounts. Sodium is found in table salt. Most people eat far more salt than they need. There are 2main reasons for this: Salt is present in high amounts in most processed foods (pre-prepared foods like breakfast cereals, cookies, and pickles) and in all restaurant foods. In other words, if you are not cooking from fresh ingredients at home, you are very likely eating more salt than you need. When sodium intake is too high, it can increase thirst and cause the body to retain fluid. To avoid these side effects, people with chronic kidney disease are often told to eat less sodium. The tips on this sheet can show you how.  People with chronic kidney disease should restrict their salt intake to less than 1,500 milligrams (mg) of sodium daily. Food labels generally report the sodium content. Table salt is sodium chloride. One level teaspoon of table salt contains 2.300 mg of sodium.    When you shop for food  Unlike canned and processed foods, fresh foods have no added salt and are better for you. When youre food shopping:  · Choose fresh foods when you can.  · Read food labels before buying packaged foods. Check the labels nutrition facts for sodium amounts and servings per package.  · Try to pick packaged foods with a sodium content of 140 mg (milligrams) or less per serving.  · Do not choose foods with over 400 mg of sodium per serving.  Season instead of salt  Try the seasonings and foods listed below to season without sodium.  · Basil: tomatoes, squash, eggplant, soups, fish  · Newell: soups, rice, lentils, chicken  · Dill: beets, cucumbers, green beans  · Garlic: sauces, vegetables, meats, fish  · Flower: carrots, chicken, cooked fruit, white sauces  · Lemon: asparagus, artichokes, broccoli, spinach, fish  · Mint: cold soups, salads, fruit dishes  · Oregano: eggplant, chicken, salads, sauces  · Thyme: chicken,  fish, lean meats, soups, stews  Food that has salt added during cooking tastes less salty than if salt is sprinkled on it at the table. Therefore, use half the amount of salt you want to have in your food during cooking, and sprinkle the other half on the food at the table.  Do not use seasoned salt or salt substitutes. They may contain sodium or potassium (another mineral people with kidney disease are often told to limit).  Date Last Reviewed: 2/1/2017 © 2000-2017 [employer]. [address] [place] [address]. All rights reserved. This information is not intended as a substitute for professional medical care. Always follow your healthcare professional's instructions.
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
## 2017-11-28 ENCOUNTER — OFFICE VISIT (OUTPATIENT)
Dept: HEMATOLOGY/ONCOLOGY | Facility: CLINIC | Age: 57
End: 2017-11-28
Payer: MEDICARE

## 2017-11-28 VITALS
TEMPERATURE: 99 F | HEIGHT: 59 IN | SYSTOLIC BLOOD PRESSURE: 120 MMHG | HEART RATE: 93 BPM | BODY MASS INDEX: 59.07 KG/M2 | DIASTOLIC BLOOD PRESSURE: 70 MMHG | RESPIRATION RATE: 18 BRPM | WEIGHT: 293 LBS | OXYGEN SATURATION: 98 %

## 2017-11-28 DIAGNOSIS — Z85.048 HISTORY OF ANAL CANCER: Primary | ICD-10-CM

## 2017-11-28 DIAGNOSIS — E66.01 MORBID OBESITY WITH BMI OF 60.0-69.9, ADULT: ICD-10-CM

## 2017-11-28 PROCEDURE — 99205 OFFICE O/P NEW HI 60 MIN: CPT | Mod: S$GLB,,, | Performed by: INTERNAL MEDICINE

## 2017-11-28 PROCEDURE — 99999 PR PBB SHADOW E&M-EST. PATIENT-LVL III: CPT | Mod: PBBFAC,,, | Performed by: INTERNAL MEDICINE
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
## 2018-01-10 ENCOUNTER — TELEPHONE (OUTPATIENT)
Dept: ORTHOPEDICS | Facility: CLINIC | Age: 58
End: 2018-01-10

## 2018-01-10 NOTE — TELEPHONE ENCOUNTER
Pt requested her records for second opinion from Dr. Samuel.  Informed pt that she would need to contact medical records.  Pt also requested that we give her a doctor her insurance would allow her to see. I advised pt the contact her insurance company to retrieve the information.  Pt became extremely irate and stated  we have falsified her documents.  Pt requested to speak to management for further assistance.

## 2018-01-19 ENCOUNTER — TELEPHONE (OUTPATIENT)
Dept: ORTHOPEDICS | Facility: CLINIC | Age: 58
End: 2018-01-19

## 2018-01-19 NOTE — TELEPHONE ENCOUNTER
Called pt per her request. She requested that I mail her her records from her visits with Dr. Samuel. I advised pt that she would have to contact medical records for a copies of her medical records. Pt advised that she has recently done so and they should be arriving in the mail soon. I advised her to allow time to receive them. Patient yelled and screamed the entire call. Patient was extremely irate and difficult to communicate with.

## 2018-01-23 ENCOUNTER — TELEPHONE (OUTPATIENT)
Dept: INTERNAL MEDICINE | Facility: CLINIC | Age: 58
End: 2018-01-23

## 2018-01-23 ENCOUNTER — OFFICE VISIT (OUTPATIENT)
Dept: INTERNAL MEDICINE | Facility: CLINIC | Age: 58
End: 2018-01-23
Payer: MEDICARE

## 2018-01-23 ENCOUNTER — HOSPITAL ENCOUNTER (OUTPATIENT)
Dept: RADIOLOGY | Facility: HOSPITAL | Age: 58
Discharge: HOME | End: 2018-01-23
Attending: FAMILY MEDICINE
Payer: MEDICARE

## 2018-01-23 VITALS
HEART RATE: 102 BPM | SYSTOLIC BLOOD PRESSURE: 124 MMHG | BODY MASS INDEX: 59.07 KG/M2 | WEIGHT: 293 LBS | OXYGEN SATURATION: 98 % | DIASTOLIC BLOOD PRESSURE: 78 MMHG | TEMPERATURE: 97 F | HEIGHT: 59 IN

## 2018-01-23 DIAGNOSIS — R05.9 COUGH: ICD-10-CM

## 2018-01-23 DIAGNOSIS — J45.21 MILD INTERMITTENT ASTHMA WITH ACUTE EXACERBATION: Primary | ICD-10-CM

## 2018-01-23 DIAGNOSIS — J45.21 MILD INTERMITTENT ASTHMA WITH ACUTE EXACERBATION: ICD-10-CM

## 2018-01-23 DIAGNOSIS — R68.89 FLU-LIKE SYMPTOMS: ICD-10-CM

## 2018-01-23 DIAGNOSIS — J45.21 MILD INTERMITTENT ASTHMA WITH EXACERBATION: Primary | ICD-10-CM

## 2018-01-23 DIAGNOSIS — E66.01 MORBID OBESITY WITH BMI OF 60.0-69.9, ADULT: ICD-10-CM

## 2018-01-23 LAB
FLUAV AG SPEC QL IA: NEGATIVE
FLUBV AG SPEC QL IA: NEGATIVE
SPECIMEN SOURCE: NORMAL

## 2018-01-23 PROCEDURE — 87400 INFLUENZA A/B EACH AG IA: CPT | Mod: PO

## 2018-01-23 PROCEDURE — 71046 X-RAY EXAM CHEST 2 VIEWS: CPT | Mod: TC,FY,PO

## 2018-01-23 PROCEDURE — 99203 OFFICE O/P NEW LOW 30 MIN: CPT | Mod: 25,S$GLB,, | Performed by: FAMILY MEDICINE

## 2018-01-23 PROCEDURE — 71046 X-RAY EXAM CHEST 2 VIEWS: CPT | Mod: 26,,, | Performed by: RADIOLOGY

## 2018-01-23 PROCEDURE — 99999 PR PBB SHADOW E&M-EST. PATIENT-LVL III: CPT | Mod: PBBFAC,,, | Performed by: FAMILY MEDICINE

## 2018-01-23 PROCEDURE — 96372 THER/PROPH/DIAG INJ SC/IM: CPT | Mod: S$GLB,,, | Performed by: FAMILY MEDICINE

## 2018-01-23 RX ORDER — METHYLPREDNISOLONE ACETATE 80 MG/ML
80 INJECTION, SUSPENSION INTRA-ARTICULAR; INTRALESIONAL; INTRAMUSCULAR; SOFT TISSUE
Status: DISCONTINUED | OUTPATIENT
Start: 2018-01-23 | End: 2018-01-23

## 2018-01-23 RX ORDER — LEVOFLOXACIN 750 MG/1
750 TABLET ORAL DAILY
Qty: 5 TABLET | Refills: 0 | Status: SHIPPED | OUTPATIENT
Start: 2018-01-23 | End: 2018-01-23 | Stop reason: SDUPTHER

## 2018-01-23 RX ORDER — PROMETHAZINE HYDROCHLORIDE AND DEXTROMETHORPHAN HYDROBROMIDE 6.25; 15 MG/5ML; MG/5ML
5 SYRUP ORAL NIGHTLY PRN
Qty: 180 ML | Refills: 0 | Status: SHIPPED | OUTPATIENT
Start: 2018-01-23 | End: 2018-01-23 | Stop reason: SDUPTHER

## 2018-01-23 RX ORDER — AZELASTINE HCL 205.5 UG/1
SPRAY NASAL
COMMUNITY
Start: 2018-01-02

## 2018-01-23 RX ORDER — KETOCONAZOLE 20 MG/ML
SHAMPOO, SUSPENSION TOPICAL
COMMUNITY
Start: 2017-12-28

## 2018-01-23 RX ORDER — PROMETHAZINE HYDROCHLORIDE AND DEXTROMETHORPHAN HYDROBROMIDE 6.25; 15 MG/5ML; MG/5ML
5 SYRUP ORAL NIGHTLY PRN
Qty: 180 ML | Refills: 0 | Status: SHIPPED | OUTPATIENT
Start: 2018-01-23 | End: 2019-02-19 | Stop reason: SDUPTHER

## 2018-01-23 RX ORDER — LEVOFLOXACIN 750 MG/1
750 TABLET ORAL DAILY
Qty: 5 TABLET | Refills: 0 | Status: SHIPPED | OUTPATIENT
Start: 2018-01-23 | End: 2018-12-05

## 2018-01-23 RX ORDER — ALBUTEROL SULFATE 90 UG/1
2 AEROSOL, METERED RESPIRATORY (INHALATION) EVERY 6 HOURS PRN
Qty: 18 G | Refills: 0 | Status: SHIPPED | OUTPATIENT
Start: 2018-01-23 | End: 2019-01-23

## 2018-01-23 RX ORDER — METHYLPREDNISOLONE ACETATE 80 MG/ML
80 INJECTION, SUSPENSION INTRA-ARTICULAR; INTRALESIONAL; INTRAMUSCULAR; SOFT TISSUE
Status: COMPLETED | OUTPATIENT
Start: 2018-01-23 | End: 2018-01-23

## 2018-01-23 RX ADMIN — METHYLPREDNISOLONE ACETATE 80 MG: 80 INJECTION, SUSPENSION INTRA-ARTICULAR; INTRALESIONAL; INTRAMUSCULAR; SOFT TISSUE at 11:01

## 2018-01-23 NOTE — PROGRESS NOTES
Subjective:       Patient ID: Bernadette Gibson is a 57 y.o. female.    Chief Complaint: Cough (congestion; wheezing) and Sore Throat    57-year-old Afro-American female patient of Dr. Lozano, here with Patient Active Problem List:     Carpal tunnel syndrome of right wrist     Chronic midline low back pain with bilateral sciatica     Elevated glycosylated hemoglobin     Essential hypertension     Hypercholesterolemia     Meralgia paresthetica of both lower extremities     Morbid obesity with BMI of 60.0-69.9, adult     History of anal cancer     Left carpal tunnel syndrome     Mild cardiomegaly     Bronchitis, acute     Moderate asthma     Shortness of breath  Not feeling well, having productive cough with wheezing, sore throat and flulike symptoms, since December, reports that she has tried taking over-the-counter medications including cough syrup but no response noted.   Patient reports that she's not been feeling well and would like to get Levaquin prescribed.   Reports that she has talked to her PCP and was not check for flu and was sent in Tamiflu, took medications but does not feel better.  Patient reports that with history of asthma she has been prescribed DuoNeb But has not been using by her pulmonologist.   Does not have albuterol rescue inhaler  Patient would like to know whether she can get flu shot and tetanus and pneumonia shot today  Reports that she was running fever, and feeling warm and cold often        Review of Systems   Constitutional: Positive for chills and fever. Negative for fatigue.   HENT: Positive for congestion and sore throat.    Eyes: Negative for visual disturbance.   Respiratory: Positive for cough and wheezing. Negative for shortness of breath.    Cardiovascular: Negative for chest pain and leg swelling.   Gastrointestinal: Negative for abdominal pain, nausea and vomiting.   Musculoskeletal: Positive for myalgias.   Skin: Negative for rash.   Neurological: Negative for  "light-headedness and headaches.   Psychiatric/Behavioral: Negative for sleep disturbance.         /78 (BP Location: Left arm, Patient Position: Sitting)   Pulse 102   Temp 97.3 °F (36.3 °C) (Tympanic)   Ht 4' 11" (1.499 m)   Wt (!) 139.4 kg (307 lb 5.1 oz)   LMP  (LMP Unknown)   SpO2 98%   BMI 62.07 kg/m²   Objective:      Physical Exam   Constitutional: She is oriented to person, place, and time. She appears well-developed and well-nourished.   HENT:   Head: Normocephalic and atraumatic.   Right Ear: External ear normal.   Left Ear: External ear normal.   Mouth/Throat: Oropharynx is clear and moist. No oropharyngeal exudate.   Faint postnasal drip noted on exam today   Neck: Neck supple.   Cardiovascular: Normal rate, regular rhythm and normal heart sounds.    No murmur heard.  Pulmonary/Chest: Effort normal and breath sounds normal. No respiratory distress. She has no wheezes.   Abdominal: Soft. Bowel sounds are normal. There is no tenderness.   Musculoskeletal: She exhibits no edema.   Lymphadenopathy:     She has no cervical adenopathy.   Neurological: She is alert and oriented to person, place, and time.   Skin: Skin is warm and dry. No rash noted.   Psychiatric: She has a normal mood and affect.         Assessment:       1. Mild intermittent asthma with acute exacerbation    2. Morbid obesity with BMI of 60.0-69.9, adult    3. Cough    4. Flu-like symptoms        Plan:   Mild intermittent asthma with acute exacerbation  -     CBC auto differential; Future; Expected date: 01/23/2018  -     X-Ray Chest PA And Lateral; Future; Expected date: 01/23/2018  -     Discontinue: methylPREDNISolone acetate injection 80 mg; Inject 1 mL (80 mg total) into the muscle one time.  -     albuterol 90 mcg/actuation inhaler; Inhale 2 puffs into the lungs every 6 (six) hours as needed for Wheezing. Rescue  Dispense: 18 g; Refill: 0  -     methylPREDNISolone acetate injection 80 mg; Inject 1 mL (80 mg total) into the " muscle one time.  Secondary to ongoing symptoms and with history of asthma Depo-Medrol 80 mg  IM×1  given today in the clinic  Will check for further labs and treat accordingly  Albuterol inhaler given today  If ongoing symptoms advised to discuss further with pulmonology  Will check for chest x-ray and if there is any sign of infection will send Levaquin  Flu shot Pneumovax and tetanus to be obtained later    Morbid obesity with BMI of 60.0-69.9, adult-lifestyle modifications recommended to lose weight with BMI 62    Cough  -     CBC auto differential; Future; Expected date: 01/23/2018  -     X-Ray Chest PA And Lateral; Future; Expected date: 01/23/2018  -     Discontinue: promethazine-dextromethorphan (PROMETHAZINE-DM) 6.25-15 mg/5 mL Syrp; Take 5 mLs by mouth nightly as needed.  Dispense: 180 mL; Refill: 0  -     promethazine-dextromethorphan (PROMETHAZINE-DM) 6.25-15 mg/5 mL Syrp; Take 5 mLs by mouth nightly as needed.  Dispense: 180 mL; Refill: 0  -     albuterol 90 mcg/actuation inhaler; Inhale 2 puffs into the lungs every 6 (six) hours as needed for Wheezing. Rescue  Dispense: 18 g; Refill: 0  -     methylPREDNISolone acetate injection 80 mg; Inject 1 mL (80 mg total) into the muscle one time.    Flu-like symptoms  -     Influenza antigen Nasal Swab  Cough syrup prescribed for symptomatic relief

## 2018-01-23 NOTE — TELEPHONE ENCOUNTER
Labs look stable, noted minimal scarring versus chronic changes in the lung, will send Levaquin as requested.  If continues to have persistent wheezing or ongoing symptoms, patient should see pulmonologist or her PCP   Please call on her cell phone at [phone] and let her know

## 2018-01-25 ENCOUNTER — TELEPHONE (OUTPATIENT)
Dept: INTERNAL MEDICINE | Facility: CLINIC | Age: 58
End: 2018-01-25

## 2018-01-25 ENCOUNTER — TELEPHONE (OUTPATIENT)
Dept: PULMONOLOGY | Facility: CLINIC | Age: 58
End: 2018-01-25

## 2018-01-25 NOTE — TELEPHONE ENCOUNTER
Spoke with pt regarding lab and X-ray results, however due to pt becoming very irate I was not able to complete phone call as she continued to yell, talk over me and eventually hung up the phone. CEDRIC

## 2018-02-06 ENCOUNTER — TELEPHONE (OUTPATIENT)
Dept: INTERNAL MEDICINE | Facility: CLINIC | Age: 58
End: 2018-02-06

## 2018-02-12 ENCOUNTER — TELEPHONE (OUTPATIENT)
Dept: INTERNAL MEDICINE | Facility: CLINIC | Age: 58
End: 2018-02-12

## 2018-02-12 NOTE — TELEPHONE ENCOUNTER
Pt called stating that cough medication that was prescribed upset her stomach and would like an alternative. Returned pt call informing her that I sent a message to Dr. Hernandez and advised pt with the response that she could try over the counter mucinex or robitussin. Pt become irate with response and it was hard to continue communicating with her as she over talked me and raised her voice but I was able to complete the call. CEDRIC

## 2018-03-28 ENCOUNTER — TELEPHONE (OUTPATIENT)
Dept: ORTHOPEDICS | Facility: CLINIC | Age: 58
End: 2018-03-28

## 2018-03-28 NOTE — TELEPHONE ENCOUNTER
----- Message from Roderick Michelle sent at 3/28/2018 12:29 PM CDT -----  Contact: self/home   Pt is having bilat knee pain and would like to schedule an appt with Dr. Ochsnerr before may if possible.        We spoke  I offered to book her an appt NOW  For May  She declined   said she will call in April for any cancels

## 2018-03-28 NOTE — TELEPHONE ENCOUNTER
----- Message from Roderick Michelle sent at 3/28/2018 12:29 PM CDT -----  Contact: self/home   Pt is having bilat knee pain and would like to schedule an appt with Dr. Ochsnerr before may if possible.

## 2018-05-24 ENCOUNTER — TELEPHONE (OUTPATIENT)
Dept: HEMATOLOGY/ONCOLOGY | Facility: CLINIC | Age: 58
End: 2018-05-24

## 2018-05-24 NOTE — TELEPHONE ENCOUNTER
----- Message from Lynn Ring sent at 5/24/2018  2:47 PM CDT -----  Contact: [phone]   Returning call, please call back at [phone]. Thx-AH

## 2018-05-25 ENCOUNTER — TELEPHONE (OUTPATIENT)
Dept: SPINE | Facility: CLINIC | Age: 58
End: 2018-05-25

## 2018-05-25 DIAGNOSIS — M54.50 LUMBAR PAIN: Primary | ICD-10-CM

## 2018-06-20 ENCOUNTER — OFFICE VISIT (OUTPATIENT)
Dept: SPINE | Facility: CLINIC | Age: 58
End: 2018-06-20
Payer: MEDICARE

## 2018-06-20 ENCOUNTER — HOSPITAL ENCOUNTER (OUTPATIENT)
Dept: RADIOLOGY | Facility: OTHER | Age: 58
Discharge: HOME | End: 2018-06-20
Attending: PHYSICIAN ASSISTANT
Payer: MEDICARE

## 2018-06-20 VITALS
WEIGHT: 293 LBS | DIASTOLIC BLOOD PRESSURE: 80 MMHG | SYSTOLIC BLOOD PRESSURE: 160 MMHG | BODY MASS INDEX: 59.07 KG/M2 | HEIGHT: 59 IN | HEART RATE: 92 BPM

## 2018-06-20 DIAGNOSIS — M54.42 ACUTE BILATERAL LOW BACK PAIN WITH LEFT-SIDED SCIATICA: ICD-10-CM

## 2018-06-20 DIAGNOSIS — M43.10 ACQUIRED SPONDYLOLISTHESIS: ICD-10-CM

## 2018-06-20 DIAGNOSIS — M51.37 DDD (DEGENERATIVE DISC DISEASE), LUMBOSACRAL: ICD-10-CM

## 2018-06-20 DIAGNOSIS — M47.819 SPONDYLOSIS WITHOUT MYELOPATHY: ICD-10-CM

## 2018-06-20 DIAGNOSIS — M54.14 THORACIC AND LUMBOSACRAL NEURITIS: ICD-10-CM

## 2018-06-20 DIAGNOSIS — M54.50 LUMBAR PAIN: ICD-10-CM

## 2018-06-20 DIAGNOSIS — M54.17 THORACIC AND LUMBOSACRAL NEURITIS: ICD-10-CM

## 2018-06-20 PROCEDURE — 72100 X-RAY EXAM L-S SPINE 2/3 VWS: CPT | Mod: 26,,, | Performed by: RADIOLOGY

## 2018-06-20 PROCEDURE — 72120 X-RAY BEND ONLY L-S SPINE: CPT | Mod: TC,FY

## 2018-06-20 PROCEDURE — 99999 PR PBB SHADOW E&M-EST. PATIENT-LVL IV: CPT | Mod: PBBFAC,,, | Performed by: PHYSICIAN ASSISTANT

## 2018-06-20 PROCEDURE — 72120 X-RAY BEND ONLY L-S SPINE: CPT | Mod: 26,,, | Performed by: RADIOLOGY

## 2018-06-20 PROCEDURE — 99204 OFFICE O/P NEW MOD 45 MIN: CPT | Mod: S$GLB,,, | Performed by: PHYSICIAN ASSISTANT

## 2018-06-20 PROCEDURE — 3008F BODY MASS INDEX DOCD: CPT | Mod: CPTII,S$GLB,, | Performed by: PHYSICIAN ASSISTANT

## 2018-06-20 RX ORDER — TIZANIDINE 4 MG/1
4 TABLET ORAL EVERY 8 HOURS PRN
Qty: 270 TABLET | Refills: 0 | Status: SHIPPED | OUTPATIENT
Start: 2018-06-20

## 2018-06-20 RX ORDER — CHOLECALCIFEROL (VITAMIN D3) 25 MCG
1000 TABLET ORAL DAILY
COMMUNITY

## 2018-06-20 RX ORDER — METHYLPREDNISOLONE 4 MG/1
TABLET ORAL
Qty: 1 PACKAGE | Refills: 0 | Status: SHIPPED | OUTPATIENT
Start: 2018-06-20 | End: 2018-07-11

## 2018-06-20 RX ORDER — DICLOFENAC SODIUM 75 MG/1
75 TABLET, DELAYED RELEASE ORAL 2 TIMES DAILY PRN
Qty: 180 TABLET | Refills: 0 | Status: SHIPPED | OUTPATIENT
Start: 2018-06-20

## 2018-06-20 RX ORDER — GABAPENTIN 300 MG/1
CAPSULE ORAL
Qty: 270 CAPSULE | Refills: 0 | Status: SHIPPED | OUTPATIENT
Start: 2018-06-20
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
## 2018-06-28 ENCOUNTER — TELEPHONE (OUTPATIENT)
Dept: SPINE | Facility: CLINIC | Age: 58
End: 2018-06-28

## 2018-06-28 NOTE — TELEPHONE ENCOUNTER
Called and spoke with the pharmacy.  They stated that they can transfer the prescriptions to [facility].    Called and left patient a message that they will be transferring the prescriptions and if any aditional questions or concerns for patient to let us know.

## 2018-06-28 NOTE — TELEPHONE ENCOUNTER
----- Message from Terrence Peraza sent at 6/28/2018  9:41 AM CDT -----      Can the clinic reply in [facility]:no      Please refill the medication(s) listed below. Please call the patient when the prescription(s) is ready for  at this phone number [phone]     ##Pt states she would like to speak with the nurse. She would like the name brand form of the medications below called in. She states the generic brands make her sick. She also states the rx's were sent to [facility], but they do not have them so she would like to have them called in to the pharmacy below.##  Medication #1- diclofenac (VOLTAREN) 75 MG EC tablet 180 tablet 0 6/20/2018  --  Sig - Route: Take 1 tablet (75 mg total) by mouth 2 (two) times daily as needed. - Oral  Class: Normal  Order: [id number]      Medication #2 -gabapentin (NEURONTIN) 300 MG capsule 270 capsule 0 6/20/2018  --  Sig: Take one cap PO QHS for 7 days, then one cap PO BID for the next 7 days, and then take one cap PO TID and continue  Class: Normal  Order: [id number]    Medication #3-methylPREDNISolone (MEDROL, PATRICIA,) 4 mg tablet 1 Package 0 6/20/2018 7/11/2018 --  Sig: use as directed  Class: Normal  Order: [id number]      Medication #4-tiZANidine (ZANAFLEX) 4 MG tablet 270 tablet 0 6/20/2018  --  Sig - Route: Take 1 tablet (4 mg total) by mouth every 8 (eight) hours as needed. - Oral  Class: Normal  Order: [id number]  Ordered from SmartSet: AMB SPINE GENERAL          Preferred Pharmacy:[facility] [address]  [phone] (Phone)  [phone] (Fax)

## 2018-06-29 ENCOUNTER — HOSPITAL ENCOUNTER (OUTPATIENT)
Dept: RADIOLOGY | Facility: HOSPITAL | Age: 58
Discharge: HOME | End: 2018-06-29
Attending: PHYSICIAN ASSISTANT
Payer: MEDICARE

## 2018-06-29 DIAGNOSIS — M54.14 THORACIC AND LUMBOSACRAL NEURITIS: ICD-10-CM

## 2018-06-29 DIAGNOSIS — M54.42 ACUTE BILATERAL LOW BACK PAIN WITH LEFT-SIDED SCIATICA: ICD-10-CM

## 2018-06-29 DIAGNOSIS — M43.10 ACQUIRED SPONDYLOLISTHESIS: ICD-10-CM

## 2018-06-29 DIAGNOSIS — M54.17 THORACIC AND LUMBOSACRAL NEURITIS: ICD-10-CM

## 2018-06-29 DIAGNOSIS — M51.37 DDD (DEGENERATIVE DISC DISEASE), LUMBOSACRAL: ICD-10-CM

## 2018-06-29 DIAGNOSIS — M47.819 SPONDYLOSIS WITHOUT MYELOPATHY: ICD-10-CM

## 2018-07-02 ENCOUNTER — TELEPHONE (OUTPATIENT)
Dept: SPINE | Facility: CLINIC | Age: 58
End: 2018-07-02

## 2018-07-02 NOTE — TELEPHONE ENCOUNTER
Called left message for patient to return call.  Patient returned call and left another message. Called a second time and left another message for patient to return call.

## 2018-09-05 ENCOUNTER — TELEPHONE (OUTPATIENT)
Dept: PULMONOLOGY | Facility: CLINIC | Age: 58
End: 2018-09-05

## 2018-09-05 NOTE — TELEPHONE ENCOUNTER
----- Message from Milka Jimenez sent at 9/5/2018  1:07 PM CDT -----  Contact: Pt  Please give pt a call at ..[phone] (home) regarding her getting a nebulizer.

## 2018-09-05 NOTE — TELEPHONE ENCOUNTER
----- Message from Linda Kincaidite sent at 9/5/2018  3:31 PM CDT -----  Contact: Pt  Pt called and stated she needs a nebulizer machine from [employer] (item #[id number]) . She can be reached at [phone] (home).    Thanks,  TF

## 2018-09-06 DIAGNOSIS — J45.41 MODERATE PERSISTENT ASTHMA WITH EXACERBATION: Primary | ICD-10-CM

## 2018-09-06 NOTE — TELEPHONE ENCOUNTER
----- Message from Sydney Silveira sent at 9/6/2018  9:50 AM CDT -----  Contact: pt  She's calling stating that her order for a nebulizer machine was supposed to be sent to [facility], but they haven't received it yet, would like orders faxed to [facility] at [phone], please advise [phone] (home). Pt was very irate

## 2018-12-05 ENCOUNTER — OFFICE VISIT (OUTPATIENT)
Dept: PULMONOLOGY | Facility: CLINIC | Age: 58
End: 2018-12-05
Payer: MEDICARE

## 2018-12-05 VITALS
HEIGHT: 59 IN | WEIGHT: 293 LBS | SYSTOLIC BLOOD PRESSURE: 130 MMHG | DIASTOLIC BLOOD PRESSURE: 82 MMHG | OXYGEN SATURATION: 98 % | RESPIRATION RATE: 19 BRPM | BODY MASS INDEX: 59.07 KG/M2 | HEART RATE: 112 BPM

## 2018-12-05 DIAGNOSIS — J45.998 UNCONTROLLED PERSISTENT ASTHMA: Primary | ICD-10-CM

## 2018-12-05 DIAGNOSIS — R60.0 EDEMA, LOWER EXTREMITY: ICD-10-CM

## 2018-12-05 PROCEDURE — 99214 OFFICE O/P EST MOD 30 MIN: CPT | Mod: PBBFAC,PO | Performed by: INTERNAL MEDICINE

## 2018-12-05 PROCEDURE — 99999 PR PBB SHADOW E&M-EST. PATIENT-LVL IV: CPT | Mod: PBBFAC,,, | Performed by: INTERNAL MEDICINE

## 2018-12-05 PROCEDURE — 99214 OFFICE O/P EST MOD 30 MIN: CPT | Mod: S$GLB,,, | Performed by: INTERNAL MEDICINE

## 2018-12-05 RX ORDER — PREDNISONE 10 MG/1
TABLET ORAL
Qty: 21 TABLET | Refills: 0 | Status: SHIPPED | OUTPATIENT
Start: 2018-12-05 | End: 2019-02-19

## 2018-12-05 RX ORDER — IPRATROPIUM BROMIDE AND ALBUTEROL SULFATE 2.5; .5 MG/3ML; MG/3ML
3 SOLUTION RESPIRATORY (INHALATION) EVERY 6 HOURS PRN
Qty: 120 VIAL | Refills: 5 | Status: SHIPPED | OUTPATIENT
Start: 2018-12-05 | End: 2019-12-05

## 2018-12-05 RX ORDER — PREDNISONE 10 MG/1
TABLET ORAL
Qty: 21 TABLET | Refills: 0 | Status: SHIPPED | OUTPATIENT
Start: 2018-12-05 | End: 2018-12-05

## 2018-12-05 NOTE — PROGRESS NOTES
Pulmonary Outpatient Follow Up Visit     Subjective:    This patient is new to me   Patient ID: Bernadette Gibson is a 58 y.o. female.    Chief Complaint: Shortness of Breath and Asthma      HPI  58-year-old female patient with previous history of asthma in 2017 presents for follow-up.    She is complaining of a worsening cough, yellowish sputum production, wheezing, were sleep at night and during sleep since April 2018.    She complains of significant exposure to passive smoking.    Did smoke for 1 year 25 years ago.    She states use albuterol 4-5 times per day.    On her last visit in our practice she was started on Lasix 20 mg for lower extremity edema which she still complaining of.  She was also prescribed a prednisone taper    Her nebulizer machine is malfunctioning she is requesting a new machine.  Also requesting a refill on her DuoNeb.      Review of Systems   Constitutional: Positive for fatigue. Negative for fever and chills.   HENT: Positive for congestion. Negative for nosebleeds and hearing loss.    Eyes: Negative for redness and itching.   Respiratory: Positive for cough, sputum production, shortness of breath, wheezing, asthma nighttime symptoms and use of rescue inhaler.    Cardiovascular: Positive for leg swelling. Negative for chest pain.   Genitourinary: Negative for hematuria.   Endocrine: Negative for polyphagia.    Musculoskeletal: Positive for arthralgias, back pain and gait problem.   Skin: Negative for rash.   Gastrointestinal: Negative for vomiting.   Neurological: Negative for syncope.   Hematological: Negative for adenopathy.   Psychiatric/Behavioral: The patient is not nervous/anxious.          Outpatient Encounter Medications as of 12/5/2018   Medication Sig Dispense Refill    albuterol 90 mcg/actuation inhaler Inhale 2 puffs into the lungs every 6 (six) hours as needed for Wheezing. Rescue 18 g 0    azelastine 0.15 % (205.5 mcg) Spry        "diclofenac (VOLTAREN) 75 MG EC tablet Take 1 tablet (75 mg total) by mouth 2 (two) times daily as needed. 180 tablet 0    furosemide (LASIX) 20 MG tablet Take 1 tablet (20 mg total) by mouth once daily. For edema 30 tablet 11    gabapentin (NEURONTIN) 300 MG capsule Take one cap PO QHS for 7 days, then one cap PO BID for the next 7 days, and then take one cap PO TID and continue 270 capsule 0    ketoconazole (NIZORAL) 2 % shampoo       tiZANidine (ZANAFLEX) 4 MG tablet Take 1 tablet (4 mg total) by mouth every 8 (eight) hours as needed. 270 tablet 0    vitamin D 1000 units Tab Take 1,000 Units by mouth once daily.      vitamin E 200 UNIT capsule Take 200 Units by mouth once daily.      [DISCONTINUED] levoFLOXacin (LEVAQUIN) 750 MG tablet Take 1 tablet (750 mg total) by mouth once daily. 5 tablet 0    albuterol-ipratropium (DUO-NEB) 2.5 mg-0.5 mg/3 mL nebulizer solution Take 3 mLs by nebulization every 6 (six) hours as needed for Wheezing. Rescue 120 vial 5    [DISCONTINUED] predniSONE (DELTASONE) 10 MG tablet Prednisone 40 mg daily for 3 days then 20 mg daily for 3 days then 10 mg daily for 3 days 21 tablet 0     No facility-administered encounter medications on file as of 12/5/2018.        Objective:     Vital Signs (Most Recent)  Vital Signs  Pulse: (!) 112  Resp: 19  SpO2: 98 %  BP: 130/82  Height and Weight  Height: 4' 11" (149.9 cm)  Weight: (!) 136.4 kg (300 lb 11.3 oz)  BSA (Calculated - sq m): 2.38 sq meters  BMI (Calculated): 60.9  Weight in (lb) to have BMI = 25: 123.5]  Wt Readings from Last 3 Encounters:   12/05/18 (!) 136.4 kg (300 lb 11.3 oz)   06/20/18 (!) 139.3 kg (307 lb)   01/23/18 (!) 139.4 kg (307 lb 5.1 oz)     Temp Readings from Last 3 Encounters:   01/23/18 97.3 °F (36.3 °C) (Tympanic)   11/28/17 98.7 °F (37.1 °C) (Oral)   07/07/17 98.6 °F (37 °C) (Tympanic)     Height: 4' 11" (149.9 cm)          Physical Exam   Constitutional: She is oriented to person, place, and time.   Morbidly " obese   HENT:   Head: Normocephalic.   Neck: Neck supple.   Cardiovascular: Regular rhythm.   Pulmonary/Chest: Normal expansion and symmetric chest wall expansion. She has decreased breath sounds.   Abdominal: Soft.   Musculoskeletal: She exhibits edema.   Lymphadenopathy:     She has no cervical adenopathy.   Neurological: She is alert and oriented to person, place, and time.   Skin: Skin is warm.   Psychiatric: She has a normal mood and affect.   Nursing note and vitals reviewed.      Laboratory    Lab Results   Component Value Date    WBC 8.31 01/23/2018    HGB 13.3 01/23/2018    HCT 41.6 01/23/2018    MCV 86 01/23/2018     01/23/2018     BMP  Lab Results   Component Value Date     06/10/2008    K 4.3 06/10/2008     06/10/2008    CO2 25 06/10/2008    BUN 12 06/10/2008    CREATININE 0.9 06/10/2008    CALCIUM 9.8 06/10/2008     BNP  @LABRCNTIP(BNP,BNPTRIAGEBLO)@  No results found for: TSH  ABG  @LABRCNTIP(PH,PO2,PCO2,HCO3,BE)@    Diagnostic Results:  Chest x-ray January 2018 personally reviewed no acute findings.      Spirometry July 2017 personally reviewed no obstruction or restriction noted    Assessment/Plan:   Uncontrolled persistent asthma  -     X-Ray Chest PA And Lateral; Future; Expected date: 12/05/2018  -     Complete PFT with bronchodilator; Future  -     albuterol-ipratropium (DUO-NEB) 2.5 mg-0.5 mg/3 mL nebulizer solution; Take 3 mLs by nebulization every 6 (six) hours as needed for Wheezing. Rescue  Dispense: 120 vial; Refill: 5  -     NEBULIZER FOR HOME USE    Prednisone taper    Edema, lower extremity  -     Ambulatory Referral to Cardiology    BMI 60.0-69.9, adult    General weight loss/lifestyle modification strategies discussed (elicit support from others; identify saboteurs; non-food rewards).  Diet interventions: low calorie (1000 kCal/d) deficit diet    Not interested in investigating sleep disturbances.    Received the PSV 23 March 2018.  Influenza vaccination  2018.    Follow-up in about 3 months (around 3/5/2019).    This note was prepared using voice recognition system and is likely to have sound alike errors that may have been overlooked even after proof reading.  Please call me with any questions    Discussed diagnosis, its evaluation, treatment and usual course. All questions answered.    Thank you for the courtesy of participating in the care of this patient    Pan Jalloh MD

## 2018-12-10 ENCOUNTER — TELEPHONE (OUTPATIENT)
Dept: PULMONOLOGY | Facility: CLINIC | Age: 58
End: 2018-12-10

## 2018-12-10 NOTE — TELEPHONE ENCOUNTER
----- Message from Galina Horner sent at 12/10/2018 12:24 PM CST -----  needs to know when nebulizer was sent bc it hasn't been received..[phone] (home)

## 2018-12-10 NOTE — TELEPHONE ENCOUNTER
LVM informing patient that neb medication was sent to the [facility]. That they confirmed that they received it. ----- Message from Galina Horner sent at 12/10/2018 12:24 PM CST -----  needs to know when nebulizer was sent bc it hasn't been received..[phone] (home)

## 2018-12-11 ENCOUNTER — HOSPITAL ENCOUNTER (OUTPATIENT)
Dept: RADIOLOGY | Facility: HOSPITAL | Age: 58
Discharge: HOME | End: 2018-12-11
Attending: INTERNAL MEDICINE
Payer: MEDICARE

## 2018-12-11 DIAGNOSIS — J45.998 UNCONTROLLED PERSISTENT ASTHMA: ICD-10-CM

## 2018-12-11 PROCEDURE — 71046 X-RAY EXAM CHEST 2 VIEWS: CPT | Mod: TC,FY,PO

## 2018-12-11 PROCEDURE — 71046 X-RAY EXAM CHEST 2 VIEWS: CPT | Mod: 26,,, | Performed by: RADIOLOGY

## 2019-01-01 DIAGNOSIS — R60.9 EDEMA, UNSPECIFIED TYPE: ICD-10-CM

## 2019-01-03 DIAGNOSIS — R60.9 EDEMA, UNSPECIFIED TYPE: ICD-10-CM

## 2019-01-03 RX ORDER — FUROSEMIDE 20 MG/1
TABLET ORAL
Qty: 90 TABLET | Refills: 3 | Status: SHIPPED | OUTPATIENT
Start: 2019-01-03

## 2019-01-03 RX ORDER — FUROSEMIDE 20 MG/1
TABLET ORAL
Qty: 30 TABLET | Refills: 8 | Status: SHIPPED | OUTPATIENT
Start: 2019-01-03

## 2019-01-03 NOTE — TELEPHONE ENCOUNTER
----- Message from Charisse Todd sent at 1/3/2019 10:45 AM CST -----  Pt needs a refill on her fluid pills (name brand only) but could not remember the name called into [facility] [address] (please call [phone]/ma

## 2019-01-13 DIAGNOSIS — Z71.9 ENCOUNTER FOR CONSULTATION: Primary | ICD-10-CM

## 2019-01-23 ENCOUNTER — OFFICE VISIT (OUTPATIENT)
Dept: CARDIOLOGY | Facility: CLINIC | Age: 59
End: 2019-01-23
Payer: MEDICARE

## 2019-01-23 ENCOUNTER — CLINICAL SUPPORT (OUTPATIENT)
Dept: CARDIOLOGY | Facility: CLINIC | Age: 59
End: 2019-01-23
Payer: MEDICARE

## 2019-01-23 VITALS
BODY MASS INDEX: 59.07 KG/M2 | HEART RATE: 83 BPM | DIASTOLIC BLOOD PRESSURE: 80 MMHG | HEIGHT: 59 IN | WEIGHT: 293 LBS | SYSTOLIC BLOOD PRESSURE: 122 MMHG

## 2019-01-23 DIAGNOSIS — I51.7 MILD CARDIOMEGALY: ICD-10-CM

## 2019-01-23 DIAGNOSIS — M54.41 CHRONIC MIDLINE LOW BACK PAIN WITH BILATERAL SCIATICA: ICD-10-CM

## 2019-01-23 DIAGNOSIS — E66.01 MORBID OBESITY WITH BMI OF 60.0-69.9, ADULT: ICD-10-CM

## 2019-01-23 DIAGNOSIS — R07.9 CHEST PAIN SYNDROME: Primary | ICD-10-CM

## 2019-01-23 DIAGNOSIS — E78.00 HYPERCHOLESTEROLEMIA: ICD-10-CM

## 2019-01-23 DIAGNOSIS — R06.02 SHORTNESS OF BREATH: ICD-10-CM

## 2019-01-23 DIAGNOSIS — M54.42 CHRONIC MIDLINE LOW BACK PAIN WITH BILATERAL SCIATICA: ICD-10-CM

## 2019-01-23 DIAGNOSIS — J45.41 MODERATE PERSISTENT ASTHMA WITH ACUTE EXACERBATION: ICD-10-CM

## 2019-01-23 DIAGNOSIS — Z71.9 ENCOUNTER FOR CONSULTATION: ICD-10-CM

## 2019-01-23 DIAGNOSIS — I10 ESSENTIAL HYPERTENSION: ICD-10-CM

## 2019-01-23 DIAGNOSIS — G89.29 CHRONIC MIDLINE LOW BACK PAIN WITH BILATERAL SCIATICA: ICD-10-CM

## 2019-01-23 DIAGNOSIS — R73.09 ELEVATED GLYCOSYLATED HEMOGLOBIN: ICD-10-CM

## 2019-01-23 PROCEDURE — 93000 EKG 12-LEAD: ICD-10-PCS | Mod: S$GLB,,, | Performed by: INTERNAL MEDICINE

## 2019-01-23 PROCEDURE — 3074F PR MOST RECENT SYSTOLIC BLOOD PRESSURE < 130 MM HG: ICD-10-PCS | Mod: CPTII,S$GLB,, | Performed by: INTERNAL MEDICINE

## 2019-01-23 PROCEDURE — 3008F BODY MASS INDEX DOCD: CPT | Mod: CPTII,S$GLB,, | Performed by: INTERNAL MEDICINE

## 2019-01-23 PROCEDURE — 3074F SYST BP LT 130 MM HG: CPT | Mod: CPTII,S$GLB,, | Performed by: INTERNAL MEDICINE

## 2019-01-23 PROCEDURE — 93000 ELECTROCARDIOGRAM COMPLETE: CPT | Mod: S$GLB,,, | Performed by: INTERNAL MEDICINE

## 2019-01-23 PROCEDURE — 99999 PR PBB SHADOW E&M-EST. PATIENT-LVL III: ICD-10-PCS | Mod: PBBFAC,,, | Performed by: INTERNAL MEDICINE

## 2019-01-23 PROCEDURE — 3079F DIAST BP 80-89 MM HG: CPT | Mod: CPTII,S$GLB,, | Performed by: INTERNAL MEDICINE

## 2019-01-23 PROCEDURE — 99999 PR PBB SHADOW E&M-EST. PATIENT-LVL III: CPT | Mod: PBBFAC,,, | Performed by: INTERNAL MEDICINE

## 2019-01-23 PROCEDURE — 99203 PR OFFICE/OUTPT VISIT, NEW, LEVL III, 30-44 MIN: ICD-10-PCS | Mod: S$GLB,,, | Performed by: INTERNAL MEDICINE

## 2019-01-23 PROCEDURE — 99203 OFFICE O/P NEW LOW 30 MIN: CPT | Mod: S$GLB,,, | Performed by: INTERNAL MEDICINE

## 2019-01-23 PROCEDURE — 3079F PR MOST RECENT DIASTOLIC BLOOD PRESSURE 80-89 MM HG: ICD-10-PCS | Mod: CPTII,S$GLB,, | Performed by: INTERNAL MEDICINE

## 2019-01-23 PROCEDURE — 3008F PR BODY MASS INDEX (BMI) DOCUMENTED: ICD-10-PCS | Mod: CPTII,S$GLB,, | Performed by: INTERNAL MEDICINE

## 2019-01-23 NOTE — PROGRESS NOTES
Subjective:   Patient ID:  Bernadette Gibson is a 58 y.o. female who presents for evaluation of Hypertension; Chest Pain; and Shortness of Breath      HPI  A 59 yo morbidly obese female with h/o anal cancer htn hlp elevated a1c is here to establish care [place] [facility] [place] [facility] her ekg was unrevealing troponin negative she was given steroids po and feels better her chest tightness resolved still has residual lefrt and rt sided chest pain from excessivecoughing and wheezing. She has no angina she is gaining weight again. Has no orthopnea or pnd nop leg swelling no palpitation she is not compliant with diet. Has no other issues clinically. She used to follow with [facility] not for any special reason  Had stress test was normal by her report . She is still coughing clear sputum. No fever opr chills has some congestion using over the counter meds for that   Past Medical History:   Diagnosis Date    Asthma     Carpal tunnel syndrome     severe    Chronic back pain     Fractures     left femur    Ruptured disc, thoracic        Past Surgical History:   Procedure Laterality Date    FEMUR FRACTURE SURGERY Left        Social History     Tobacco Use    Smoking status: Never Smoker    Smokeless tobacco: Never Used   Substance Use Topics    Alcohol use: No    Drug use: No       Family History   Problem Relation Age of Onset    No Known Problems Mother     No Known Problems Father        Current Outpatient Medications   Medication Sig    albuterol 90 mcg/actuation inhaler Inhale 2 puffs into the lungs every 6 (six) hours as needed for Wheezing. Rescue    albuterol-ipratropium (DUO-NEB) 2.5 mg-0.5 mg/3 mL nebulizer solution Take 3 mLs by nebulization every 6 (six) hours as needed for Wheezing. Rescue    azelastine 0.15 % (205.5 mcg) Spry     diclofenac (VOLTAREN) 75 MG EC tablet Take 1 tablet (75 mg total) by mouth 2 (two) times daily as needed.    furosemide (LASIX) 20 MG  tablet TAKE 1 TABLET BY MOUTH ONCE DAILY FOR  EDEMA    furosemide (LASIX) 20 MG tablet TAKE 1 TABLET BY MOUTH ONCE DAILY FOR EDEMA    gabapentin (NEURONTIN) 300 MG capsule Take one cap PO QHS for 7 days, then one cap PO BID for the next 7 days, and then take one cap PO TID and continue    ketoconazole (NIZORAL) 2 % shampoo     predniSONE (DELTASONE) 10 MG tablet Prednisone 40 mg daily for 3 days then 20 mg daily for 3 days then 10 mg daily for 3 days    tiZANidine (ZANAFLEX) 4 MG tablet Take 1 tablet (4 mg total) by mouth every 8 (eight) hours as needed.    vitamin D 1000 units Tab Take 1,000 Units by mouth once daily.    vitamin E 200 UNIT capsule Take 200 Units by mouth once daily.     No current facility-administered medications for this visit.      Current Outpatient Medications on File Prior to Visit   Medication Sig    albuterol 90 mcg/actuation inhaler Inhale 2 puffs into the lungs every 6 (six) hours as needed for Wheezing. Rescue    albuterol-ipratropium (DUO-NEB) 2.5 mg-0.5 mg/3 mL nebulizer solution Take 3 mLs by nebulization every 6 (six) hours as needed for Wheezing. Rescue    azelastine 0.15 % (205.5 mcg) Spry     diclofenac (VOLTAREN) 75 MG EC tablet Take 1 tablet (75 mg total) by mouth 2 (two) times daily as needed.    furosemide (LASIX) 20 MG tablet TAKE 1 TABLET BY MOUTH ONCE DAILY FOR  EDEMA    furosemide (LASIX) 20 MG tablet TAKE 1 TABLET BY MOUTH ONCE DAILY FOR EDEMA    gabapentin (NEURONTIN) 300 MG capsule Take one cap PO QHS for 7 days, then one cap PO BID for the next 7 days, and then take one cap PO TID and continue    ketoconazole (NIZORAL) 2 % shampoo     predniSONE (DELTASONE) 10 MG tablet Prednisone 40 mg daily for 3 days then 20 mg daily for 3 days then 10 mg daily for 3 days    tiZANidine (ZANAFLEX) 4 MG tablet Take 1 tablet (4 mg total) by mouth every 8 (eight) hours as needed.    vitamin D 1000 units Tab Take 1,000 Units by mouth once daily.    vitamin E 200 UNIT  capsule Take 200 Units by mouth once daily.     No current facility-administered medications on file prior to visit.        Review of patient's allergies indicates:   Allergen Reactions    Amoxicillin Nausea Only    Codeine Rash    Amoxicillin-pot clavulanate Nausea And Vomiting    Vibramycin [doxycycline monohydrate] Nausea Only       Review of Systems   Constitution: Negative for diaphoresis, weakness, malaise/fatigue and weight gain.   HENT: Negative for hoarse voice.    Eyes: Negative for double vision and visual disturbance.   Cardiovascular: Positive for dyspnea on exertion. Negative for chest pain, claudication, cyanosis, irregular heartbeat, leg swelling, near-syncope, orthopnea, palpitations, paroxysmal nocturnal dyspnea and syncope.   Respiratory: Positive for cough and shortness of breath. Negative for hemoptysis and snoring.    Hematologic/Lymphatic: Negative for bleeding problem. Does not bruise/bleed easily.   Skin: Negative for color change and poor wound healing.   Musculoskeletal: Negative for muscle cramps, muscle weakness and myalgias.   Gastrointestinal: Negative for bloating, abdominal pain, change in bowel habit, diarrhea, heartburn, hematemesis, hematochezia, melena and nausea.   Neurological: Negative for excessive daytime sleepiness, dizziness, headaches, light-headedness, loss of balance and numbness.   Psychiatric/Behavioral: Negative for memory loss. The patient does not have insomnia.    Allergic/Immunologic: Negative for hives.       Objective:   Physical Exam   Constitutional: She is oriented to person, place, and time. She appears well-developed and well-nourished. She does not appear ill. No distress.   HENT:   Head: Normocephalic and atraumatic.   Eyes: EOM are normal. Pupils are equal, round, and reactive to light. No scleral icterus.   Neck: Normal range of motion. Neck supple. Normal carotid pulses, no hepatojugular reflux and no JVD present. Carotid bruit is not present. No  "tracheal deviation present. No thyromegaly present.   Cardiovascular: Normal rate, regular rhythm, normal heart sounds and normal pulses. Exam reveals no gallop and no friction rub.   No murmur heard.  Pulmonary/Chest: Effort normal and breath sounds normal. No respiratory distress. She has no wheezes. She has no rhonchi. She has no rales. She exhibits no tenderness.   Abdominal: Soft. Normal appearance, normal aorta and bowel sounds are normal. She exhibits no distension, no abdominal bruit, no ascites and no pulsatile midline mass. There is no hepatomegaly. There is no tenderness.   Obese    Musculoskeletal: She exhibits no edema.        Right shoulder: She exhibits no deformity.   Neurological: She is alert and oriented to person, place, and time. She has normal strength. No cranial nerve deficit. Coordination normal.   Skin: Skin is warm and dry. No rash noted. No cyanosis or erythema. Nails show no clubbing.   Psychiatric: She has a normal mood and affect. Her speech is normal and behavior is normal.     Vitals:    01/23/19 1140 01/23/19 1212   BP: 134/78 122/80   BP Location: Right arm    Patient Position: Sitting    BP Method: Large (Manual)    Pulse: 83    Weight: 136.1 kg (300 lb)    Height: 4' 11" (1.499 m)      No results found for: CHOL  No results found for: HDL  No results found for: LDLCALC  No results found for: TRIG  No results found for: CHOLHDL    Chemistry        Component Value Date/Time     06/10/2008 1228    K 4.3 06/10/2008 1228     06/10/2008 1228    CO2 25 06/10/2008 1228    BUN 12 06/10/2008 1228    CREATININE 0.9 06/10/2008 1228    GLU 87 06/10/2008 1228        Component Value Date/Time    CALCIUM 9.8 06/10/2008 1228          No results found for: TSH  No results found for: INR, PROTIME  Lab Results   Component Value Date    WBC 8.31 01/23/2018    HGB 13.3 01/23/2018    HCT 41.6 01/23/2018    MCV 86 01/23/2018     01/23/2018     BNP  @LABRCNTIP(BNP,BNPTRIAGEBLO)@  [facility] " cannot be calculated (Patient's most recent lab result is older than the maximum 7 days allowed.).  Assessment:     1. Chest pain syndrome    2. Chronic midline low back pain with bilateral sciatica    3. Elevated glycosylated hemoglobin    4. Essential hypertension    5. Hypercholesterolemia    6. Morbid obesity with BMI of 60.0-69.9, adult    7. Mild cardiomegaly    8. Moderate persistent asthma with acute exacerbation    9. Shortness of breath      Pain is residue of upper respiratory illness. [facility] [facility] [place] [facility]. counsled about diet weight loss. I will get records form [facility] previous cardiac w/u. Further recommendation to follow.  Plan:   Continue current therapy  Cardiac low salt diet.  Risk factor modification and excercise program.  F/u after review recordfs from [facility]

## 2019-01-28 ENCOUNTER — TELEPHONE (OUTPATIENT)
Dept: HEMATOLOGY/ONCOLOGY | Facility: CLINIC | Age: 59
End: 2019-01-28

## 2019-02-14 ENCOUNTER — TELEPHONE (OUTPATIENT)
Dept: PULMONOLOGY | Facility: CLINIC | Age: 59
End: 2019-02-14

## 2019-02-14 NOTE — TELEPHONE ENCOUNTER
----- Message from Galina Horner sent at 2/14/2019 11:38 AM CST -----  .Type:  Needs Medical Advice    Who Called: pt  Symptoms (please be specific): severe cough/yellow, green mucus  How long has patient had these symptoms:  half a month  Pharmacy name and phone #:    Would the patient rather a call back or a response via [facility]? callback  Best Call Back Number: [phone] (home)   Additional Information:

## 2019-02-19 ENCOUNTER — NURSE TRIAGE (OUTPATIENT)
Dept: ADMINISTRATIVE | Facility: CLINIC | Age: 59
End: 2019-02-19

## 2019-02-19 DIAGNOSIS — J45.41 MODERATE PERSISTENT ASTHMA WITH ACUTE EXACERBATION: ICD-10-CM

## 2019-02-19 DIAGNOSIS — R05.9 COUGH: ICD-10-CM

## 2019-02-19 NOTE — TELEPHONE ENCOUNTER
Pt requesting medication to treat cough.    Duration of Cough: 2 weeks  Dry or Productive: (+)  Phlegm description: clear, intermittently yellow  Pleurisy: (-)  Temp: *feels warm  Tx tried: Nyquil cap and liquid   SOB:   O2 sat:  O2 L:

## 2019-02-19 NOTE — TELEPHONE ENCOUNTER
----- Message from Inga Osorio sent at 2/19/2019  6:46 AM CST -----  Contact: pt  Pt is requesting a prescription for Lepaqin. Pt wants 20 pills.  Pt is has a bad cold with congestion. Pt also asking for cough medication Pt is out of town in [place].  Pt can use [facility] on [address] [place] [address]  Phone [phone]

## 2019-02-19 NOTE — TELEPHONE ENCOUNTER
----- Message from Karen Simpson sent at 2/19/2019  3:51 PM CST -----  Contact: patient  Type:  Needs Medical Advice    Who Called: patient  Symptoms (please be specific): coughing   How long has patient had these symptoms:  -  Pharmacy name and phone #:  [facility] in [place], [place]      Would the patient rather a call back or a response via [facility]? call  Best Call Back Number: [phone]  Additional Information: Need antibiotics for cough PLEASE!!!!

## 2019-02-20 DIAGNOSIS — J45.41 MODERATE PERSISTENT ASTHMA WITH ACUTE EXACERBATION: ICD-10-CM

## 2019-02-20 RX ORDER — CLARITHROMYCIN 500 MG/1
1000 TABLET, FILM COATED, EXTENDED RELEASE ORAL DAILY
Qty: 20 TABLET | Refills: 1 | Status: SHIPPED | OUTPATIENT
Start: 2019-02-20 | End: 2019-02-20 | Stop reason: SDUPTHER

## 2019-02-20 RX ORDER — PROMETHAZINE HYDROCHLORIDE AND DEXTROMETHORPHAN HYDROBROMIDE 6.25; 15 MG/5ML; MG/5ML
5 SYRUP ORAL NIGHTLY PRN
Qty: 180 ML | Refills: 1 | Status: SHIPPED | OUTPATIENT
Start: 2019-02-20

## 2019-02-20 RX ORDER — PREDNISONE 20 MG/1
TABLET ORAL
Qty: 20 TABLET | Refills: 1 | Status: SHIPPED | OUTPATIENT
Start: 2019-02-20

## 2019-02-20 NOTE — TELEPHONE ENCOUNTER
----- Message from Rebecca Porter sent at 2/20/2019  8:08 AM CST -----  Contact: pt   Call pt states that she is still waiting on the Antibiotic. Pt states that she is out [place] states and has a cold with mucus and need to get rid of the cold.    [phone] (home)

## 2019-02-20 NOTE — TELEPHONE ENCOUNTER
----- Message from Verónica Beck sent at 2/20/2019  9:13 AM CST -----  Contact: PT  She's calling in regards to medication       pls call pt back at [phone] (home)

## 2019-02-20 NOTE — TELEPHONE ENCOUNTER
Reason for Disposition   Caller requesting a NON-URGENT new prescription or refill and triager unable to refill per unit policy    Answer Assessment - Initial Assessment Questions  Pt reported she is out of town and having severe problems with cough. She has called earlier today but no response. Noted the information given to nurse in clinic when she called earlier which she stated is correct. Is requesting levaquin be called in and a cough med. Is currently in [place] and will return to [place] in 2 wks. Wants nurse to call in medication.    Protocols used: ST MEDICATION QUESTION CALL-A-

## 2019-02-21 RX ORDER — CLARITHROMYCIN 500 MG/1
1000 TABLET, FILM COATED, EXTENDED RELEASE ORAL DAILY
Qty: 20 TABLET | Refills: 1 | Status: SHIPPED | OUTPATIENT
Start: 2019-02-21 | End: 2019-03-03

## 2019-02-21 NOTE — TELEPHONE ENCOUNTER
----- Message from Molly Guerra sent at 2/21/2019  7:52 AM CST -----  Contact: self   Patient would like to know if any attempt was made to give her an antibiotic. Patient has been calling once a day and no one has tried to call her back.Patient is currently in [place]. Pt has a severe cough.(pt is being harassed by someone saying she is giving them a disease from coughing)Please call back at [phone].    Pt uses:  [id number] - [place] - [address] [facility] [address] [facility]  [address] [place] [address]  Phone: [phone] Fax: [phone]      Thanks,  Molly Guerra

## 2019-03-22 ENCOUNTER — TELEPHONE (OUTPATIENT)
Dept: PULMONOLOGY | Facility: CLINIC | Age: 59
End: 2019-03-22

## 2019-03-22 NOTE — TELEPHONE ENCOUNTER
----- Message from Kathya Katie sent at 3/22/2019 12:26 PM CDT -----  Contact: Roxanna Lozano/JOHNNIE  Type:  Patient Returning Call    Who Called: Roxanna  Who Left Message for Patient: Dr. Orantes  Does the patient know what this is regarding? n/a  Would the patient rather a call back or a response via [facility]?  Call back   Best Call Back Number: [phone] (fax number [phone])  Additional Information: She returned the call; she stated that she needs a Medical Statement on the patient.

## 2019-03-22 NOTE — TELEPHONE ENCOUNTER
----- Message from Galina Horner sent at 3/22/2019 10:49 AM CDT -----  Contact: pt  states that she needs proof that she has upper respitory problems/asthma problems in order to get assistance with utility deposit. please email proof to ms limon at [email or web address] with [facility] asap, phn is [phone]. pt  waiting..[phone] (home)

## 2019-11-21 NOTE — TELEPHONE ENCOUNTER
----- Message from Susan Bravo sent at 7/2/2018  2:19 PM CDT -----  x_  1st Request  _  2nd Request  _  3rd Request    Who: YU BUENROSTRO [[id number]]    Why: Patient is returning a call.    What Number to Call Back:[phone]    When to Expect a call back: (Within 24 hours)    Please return the call at earliest convenience. Thanks!    
Called left message for patient to return call  
[facility]

## 2023-01-04 NOTE — TELEPHONE ENCOUNTER
"----- Message from Kalyn Holloway sent at 7/2/2018  8:31 AM CDT -----  Contact: YU BUENROSTRO [[id number]]      Name of Who is Calling: YU BUENROSTRO [[id number]]      What is the request in detail:   Patient called and said, "she cannot take generic medication only brand name."  Please give patient a call back at your earliest convenience.     THANKS!      Can the clinic reply by [facility]: No      What Number to Call Back: YU BUENROSTRO / # [phone]                                    " diphenhydramine

## 2025-06-17 NOTE — TELEPHONE ENCOUNTER
Called and spoke with pt regarding labs, pt states that she would like to have a copy of labs mailed to her which I stated I will put in the mail for her today. Pt was irate due to another issue not dealing with [facility] but it was still difficult to communicate with pt due to her raise in tone and interruption. Pt verbalized understanding. CEDRIC   Detail Level: Detailed Depth Of Biopsy: dermis Was A Bandage Applied: Yes Size Of Lesion In Cm: 0 Biopsy Type: H and E Biopsy Method: Personna blade Anesthesia Type: 0.5% lidocaine with 1:100,000 epinephrine and a 1:10 solution of 8.4% sodium bicarbonate Anesthesia Volume In Cc: 0.5 Hemostasis: Drysol Wound Care: Petrolatum Dressing: bandage Destruction After The Procedure: No Type Of Destruction Used: Curettage Curettage Text: The wound bed was treated with curettage after the biopsy was performed. Cryotherapy Text: The wound bed was treated with cryotherapy after the biopsy was performed. Electrodesiccation Text: The wound bed was treated with electrodesiccation after the biopsy was performed. Electrodesiccation And Curettage Text: The wound bed was treated with electrodesiccation and curettage after the biopsy was performed. Silver Nitrate Text: The wound bed was treated with silver nitrate after the biopsy was performed. Lab: [id number] Lab Facility: [id number] Medical Necessity Information: It is in your best interest to select a reason for this procedure from the list below. All of these items fulfill various CMS LCD requirements except the new and changing color options. Consent: Written consent was obtained and risks were reviewed including but not limited to scarring, infection, bleeding, scabbing, incomplete removal, and allergy to anesthesia. Post-Care Instructions: I reviewed with the patient in detail post-care instructions. Patient is to keep the biopsy site dry overnight, and then wash once daily with soap and water, pat dry and apply petrolatum and a bandaid. Repeat 1-2 times daily until fully healed. Call the office for evaluation with development of any redness, pain, drainage, bleeding or swelling. Notification Instructions: Patient will be notified of biopsy results. However, patient instructed to call the office if not contacted within 2 weeks. Billing Type: Third-Party Bill Information: Selecting Yes will display possible errors in your note based on the variables you have selected. This validation is only offered as a suggestion for you. PLEASE NOTE THAT THE VALIDATION TEXT WILL BE REMOVED WHEN YOU FINALIZE YOUR NOTE. IF YOU WANT TO FAX A PRELIMINARY NOTE YOU WILL NEED TO TOGGLE THIS TO 'NO' IF YOU DO NOT WANT IT IN YOUR FAXED NOTE.